# Patient Record
Sex: FEMALE | Race: WHITE | NOT HISPANIC OR LATINO | Employment: OTHER | URBAN - METROPOLITAN AREA
[De-identification: names, ages, dates, MRNs, and addresses within clinical notes are randomized per-mention and may not be internally consistent; named-entity substitution may affect disease eponyms.]

---

## 2017-05-02 ENCOUNTER — TRANSCRIBE ORDERS (OUTPATIENT)
Dept: ADMINISTRATIVE | Facility: HOSPITAL | Age: 47
End: 2017-05-02

## 2017-05-02 DIAGNOSIS — R74.8 ABNORMAL LIVER ENZYMES: Primary | ICD-10-CM

## 2017-05-12 ENCOUNTER — HOSPITAL ENCOUNTER (OUTPATIENT)
Dept: ULTRASOUND IMAGING | Facility: HOSPITAL | Age: 47
Discharge: HOME | End: 2017-05-12
Attending: INTERNAL MEDICINE | Admitting: INTERNAL MEDICINE

## 2017-05-12 DIAGNOSIS — R74.8 ABNORMAL LIVER ENZYMES: ICD-10-CM

## 2017-05-12 PROCEDURE — 76705 ECHO EXAM OF ABDOMEN: CPT

## 2017-10-25 ENCOUNTER — TRANSCRIBE ORDERS (OUTPATIENT)
Dept: ADMINISTRATIVE | Facility: HOSPITAL | Age: 47
End: 2017-10-25

## 2017-10-25 DIAGNOSIS — Z12.31 VISIT FOR SCREENING MAMMOGRAM: Primary | ICD-10-CM

## 2018-01-15 ENCOUNTER — APPOINTMENT (OUTPATIENT)
Dept: MAMMOGRAPHY | Facility: HOSPITAL | Age: 48
End: 2018-01-15
Attending: OBSTETRICS & GYNECOLOGY

## 2018-02-06 ENCOUNTER — HOSPITAL ENCOUNTER (OUTPATIENT)
Dept: MAMMOGRAPHY | Facility: HOSPITAL | Age: 48
Discharge: HOME | End: 2018-02-06
Attending: OBSTETRICS & GYNECOLOGY | Admitting: OBSTETRICS & GYNECOLOGY

## 2018-02-06 DIAGNOSIS — Z12.31 VISIT FOR SCREENING MAMMOGRAM: ICD-10-CM

## 2018-02-06 PROCEDURE — 77063 BREAST TOMOSYNTHESIS BI: CPT

## 2018-02-06 PROCEDURE — 77063 BREAST TOMOSYNTHESIS BI: CPT | Performed by: RADIOLOGY

## 2018-02-06 PROCEDURE — 77067 SCR MAMMO BI INCL CAD: CPT | Performed by: RADIOLOGY

## 2018-02-06 PROCEDURE — 77067 SCR MAMMO BI INCL CAD: CPT

## 2018-08-17 ENCOUNTER — LAB (OUTPATIENT)
Dept: LAB | Facility: HOSPITAL | Age: 48
End: 2018-08-17

## 2018-08-17 ENCOUNTER — TRANSCRIBE ORDERS (OUTPATIENT)
Dept: LAB | Facility: HOSPITAL | Age: 48
End: 2018-08-17

## 2018-08-17 DIAGNOSIS — N95.1 SYMPTOMATIC MENOPAUSAL OR FEMALE CLIMACTERIC STATES: Primary | ICD-10-CM

## 2018-08-17 DIAGNOSIS — N95.1 SYMPTOMATIC MENOPAUSAL OR FEMALE CLIMACTERIC STATES: ICD-10-CM

## 2018-08-17 LAB
ESTRADIOL SERPL HS-MCNC: 26 PG/ML
TESTOST SERPL-MCNC: 24.55 NG/DL (ref 0–780.1)

## 2018-08-17 PROCEDURE — 36415 COLL VENOUS BLD VENIPUNCTURE: CPT | Performed by: OBSTETRICS & GYNECOLOGY

## 2018-08-17 PROCEDURE — 84403 ASSAY OF TOTAL TESTOSTERONE: CPT

## 2018-08-17 PROCEDURE — 82670 ASSAY OF TOTAL ESTRADIOL: CPT | Performed by: OBSTETRICS & GYNECOLOGY

## 2018-10-15 ENCOUNTER — TRANSCRIBE ORDERS (OUTPATIENT)
Dept: ADMINISTRATIVE | Facility: HOSPITAL | Age: 48
End: 2018-10-15

## 2018-10-15 DIAGNOSIS — Z12.31 VISIT FOR SCREENING MAMMOGRAM: Primary | ICD-10-CM

## 2018-10-16 ENCOUNTER — TRANSCRIBE ORDERS (OUTPATIENT)
Dept: LAB | Facility: HOSPITAL | Age: 48
End: 2018-10-16

## 2018-10-16 ENCOUNTER — LAB (OUTPATIENT)
Dept: LAB | Facility: HOSPITAL | Age: 48
End: 2018-10-16

## 2018-10-16 DIAGNOSIS — N95.1 SYMPTOMATIC MENOPAUSAL OR FEMALE CLIMACTERIC STATES: ICD-10-CM

## 2018-10-16 DIAGNOSIS — N95.1 SYMPTOMATIC MENOPAUSAL OR FEMALE CLIMACTERIC STATES: Primary | ICD-10-CM

## 2018-10-16 LAB
ESTRADIOL SERPL HS-MCNC: 27 PG/ML
TESTOST SERPL-MCNC: 29.92 NG/DL (ref 0–780.1)

## 2018-10-16 PROCEDURE — 84403 ASSAY OF TOTAL TESTOSTERONE: CPT

## 2018-10-16 PROCEDURE — 82670 ASSAY OF TOTAL ESTRADIOL: CPT | Performed by: OBSTETRICS & GYNECOLOGY

## 2018-10-16 PROCEDURE — 36415 COLL VENOUS BLD VENIPUNCTURE: CPT | Performed by: OBSTETRICS & GYNECOLOGY

## 2018-11-27 ENCOUNTER — APPOINTMENT (OUTPATIENT)
Dept: LAB | Facility: HOSPITAL | Age: 48
End: 2018-11-27

## 2018-11-27 ENCOUNTER — TRANSCRIBE ORDERS (OUTPATIENT)
Dept: LAB | Facility: HOSPITAL | Age: 48
End: 2018-11-27

## 2018-11-27 DIAGNOSIS — N95.1 SYMPTOMATIC MENOPAUSAL OR FEMALE CLIMACTERIC STATES: Primary | ICD-10-CM

## 2018-11-27 LAB — ESTRADIOL SERPL HS-MCNC: 26 PG/ML

## 2018-11-27 PROCEDURE — 82670 ASSAY OF TOTAL ESTRADIOL: CPT | Performed by: OBSTETRICS & GYNECOLOGY

## 2018-11-27 PROCEDURE — 36415 COLL VENOUS BLD VENIPUNCTURE: CPT | Performed by: OBSTETRICS & GYNECOLOGY

## 2019-02-07 ENCOUNTER — HOSPITAL ENCOUNTER (OUTPATIENT)
Dept: MAMMOGRAPHY | Facility: HOSPITAL | Age: 49
Discharge: HOME | End: 2019-02-07
Attending: OBSTETRICS & GYNECOLOGY | Admitting: OBSTETRICS & GYNECOLOGY

## 2019-02-07 DIAGNOSIS — Z12.31 VISIT FOR SCREENING MAMMOGRAM: ICD-10-CM

## 2019-02-07 PROCEDURE — 77067 SCR MAMMO BI INCL CAD: CPT

## 2019-02-07 PROCEDURE — 77063 BREAST TOMOSYNTHESIS BI: CPT

## 2019-02-07 PROCEDURE — 77063 BREAST TOMOSYNTHESIS BI: CPT | Performed by: RADIOLOGY

## 2019-02-07 PROCEDURE — 77067 SCR MAMMO BI INCL CAD: CPT | Performed by: RADIOLOGY

## 2019-06-27 ENCOUNTER — APPOINTMENT (OUTPATIENT)
Dept: GENERAL RADIOLOGY | Facility: HOSPITAL | Age: 49
End: 2019-06-27

## 2019-06-27 ENCOUNTER — HOSPITAL ENCOUNTER (EMERGENCY)
Facility: HOSPITAL | Age: 49
Discharge: HOME | End: 2019-06-27
Attending: EMERGENCY MEDICINE | Admitting: EMERGENCY MEDICINE

## 2019-06-27 VITALS
DIASTOLIC BLOOD PRESSURE: 77 MMHG | SYSTOLIC BLOOD PRESSURE: 165 MMHG | HEIGHT: 66 IN | BODY MASS INDEX: 28.93 KG/M2 | HEART RATE: 68 BPM | OXYGEN SATURATION: 100 % | TEMPERATURE: 98.5 F | RESPIRATION RATE: 16 BRPM | WEIGHT: 180 LBS

## 2019-06-27 DIAGNOSIS — S42.291A CLOSED FRACTURE OF HEAD OF RIGHT HUMERUS, INITIAL ENCOUNTER: Primary | ICD-10-CM
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
## 2019-06-27 PROCEDURE — 73030 X-RAY EXAM OF SHOULDER: CPT

## 2019-06-27 PROCEDURE — 99283 EMERGENCY DEPT VISIT LOW MDM: CPT

## 2019-06-27 RX ORDER — OXYCODONE HYDROCHLORIDE AND ACETAMINOPHEN 5; 325 MG/1; MG/1
1 TABLET ORAL EVERY 4 HOURS PRN
Qty: 18 TABLET | Refills: 0 | Status: ON HOLD | OUTPATIENT
Start: 2019-06-27 | End: 2019-07-31

## 2019-06-27 RX ORDER — OXYCODONE AND ACETAMINOPHEN 7.5; 325 MG/1; MG/1
1 TABLET ORAL ONCE
Status: COMPLETED | OUTPATIENT
Start: 2019-06-27 | End: 2019-06-27

## 2019-06-27 RX ORDER — ONDANSETRON 4 MG/1
4 TABLET, ORALLY DISINTEGRATING ORAL ONCE
Status: COMPLETED | OUTPATIENT
Start: 2019-06-27 | End: 2019-06-27

## 2019-06-27 RX ADMIN — ONDANSETRON 4 MG: 4 TABLET, ORALLY DISINTEGRATING ORAL at 22:20

## 2019-06-27 RX ADMIN — OXYCODONE HYDROCHLORIDE AND ACETAMINOPHEN 1 TABLET: 7.5; 325 TABLET ORAL at 22:13

## 2019-07-01 ENCOUNTER — OFFICE VISIT (OUTPATIENT)
Dept: ORTHOPEDIC SURGERY | Facility: CLINIC | Age: 49
End: 2019-07-01

## 2019-07-01 VITALS — OXYGEN SATURATION: 98 % | HEIGHT: 66 IN | HEART RATE: 103 BPM | BODY MASS INDEX: 28.88 KG/M2 | WEIGHT: 179.68 LBS

## 2019-07-01 DIAGNOSIS — S42.291A CLOSED 3-PART FRACTURE OF PROXIMAL END OF RIGHT HUMERUS, INITIAL ENCOUNTER: Primary | ICD-10-CM

## 2019-07-01 PROCEDURE — 99204 OFFICE O/P NEW MOD 45 MIN: CPT | Performed by: ORTHOPAEDIC SURGERY

## 2019-07-01 RX ORDER — ESTRADIOL 0.75 MG/1.25G
GEL, METERED TOPICAL
Refills: 2 | COMMUNITY
Start: 2019-04-29

## 2019-07-01 RX ORDER — BUPROPION HYDROCHLORIDE 150 MG/1
150 TABLET ORAL EVERY MORNING
COMMUNITY
Start: 2019-06-28

## 2019-07-01 RX ORDER — SODIUM, POTASSIUM,MAG SULFATES 17.5-3.13G
SOLUTION, RECONSTITUTED, ORAL ORAL
Refills: 0 | COMMUNITY
Start: 2019-04-14 | End: 2019-07-31 | Stop reason: HOSPADM

## 2019-07-01 NOTE — PROGRESS NOTES
[facility] [place]    Subjective     Chief Complaint   Patient presents with   • Right Shoulder - Pain     Closed Fracture of Head of Right Humerus  ER f/u        HPI  Ysabel Rasheed is a 49 y.o. female.  She fell on last Thursday.  Seen in the ER.  Pain is 8 out of 10.  Is on Percocet.  She is in a sling.  She is right-hand dominant.    History reviewed. No pertinent past medical history.   Past Surgical History:   Procedure Laterality Date   • HYSTERECTOMY      44      Family History   Problem Relation Age of Onset   • Breast cancer Neg Hx    • Ovarian cancer Neg Hx      Social History     Socioeconomic History   • Marital status:      Spouse name: Not on file   • Number of children: Not on file   • Years of education: Not on file   • Highest education level: Not on file   Tobacco Use   • Smoking status: Never Smoker   • Smokeless tobacco: Never Used   Substance and Sexual Activity   • Alcohol use: Yes     Comment: OCC   • Drug use: No   • Sexual activity: Defer      Current Outpatient Medications on File Prior to Visit   Medication Sig Dispense Refill   • buPROPion XL (WELLBUTRIN XL) 150 MG 24 hr tablet      • ESTROGEL 0.75 MG/1.25 GM (0.06%) topical gel   2   • oxyCODONE-acetaminophen (PERCOCET) 5-325 MG per tablet Take 1 tablet by mouth Every 4 (Four) Hours As Needed (pain). 18 tablet 0   • SUPREP BOWEL PREP KIT 17.5-3.13-1.6 GM/177ML solution oral solution   0     No current facility-administered medications on file prior to visit.       No Known Allergies     The following portions of the patient's history were reviewed and updated as appropriate: allergies, current medications, past family history, past medical history, past social history, past surgical history and problem list.    Review of Systems   Constitutional: Negative.    HENT: Negative.    Eyes: Negative.    Respiratory: Negative.    Cardiovascular: Negative.    Gastrointestinal: Negative.    Endocrine: Negative.   "  Genitourinary: Negative.    Musculoskeletal: Positive for arthralgias and joint swelling.   Skin: Negative.    Allergic/Immunologic: Negative.    Neurological: Negative.    Hematological: Negative.    Psychiatric/Behavioral: Negative.         Objective      Physical Exam  Pulse 103   Ht 167.6 cm (65.98\")   Wt 81.5 kg (179 lb 10.8 oz)   SpO2 98%   Breastfeeding? No   BMI 29.01 kg/m²     Body mass index is 29.01 kg/m².    GENERAL APPEARANCE: awake, alert & oriented x 3, in no acute distress and well developed, well nourished  PSYCH: normal mood and affect  LUNGS:  breathing nonlabored, no wheezing  EYES: sclera anicteric, pupils equal  CARDIOVASCULAR: palpable pulses dorsalis pedis, palpable posterior tibial bilaterally. Capillary refill less than 2 seconds  INTEGUMENTARY: skin intact, no clubbing, cyanosis  NEUROLOGIC:  Normal Sensation and reflexes       Ortho Exam  Right shoulder is tender.  Distally neurovascular intact.  Cannot assess motor function secondary to fracture.  The skin is intact.  No lymphadenopathy.  No erythema.  Imaging/Studies  Imaging Results (last 7 days)     ** No results found for the last 168 hours. **        I viewed the x-rays from June 27 which show a displaced three-part proximal humerus fracture  Assessment/Plan        ICD-10-CM ICD-9-CM   1. Closed 3-part fracture of proximal end of right humerus, initial encounter S42.291A 812.00       Orders Placed This Encounter   Procedures   • CT Upper Extremity Right Without Contrast      I have ordered a CT scan.  She may need surgery.  She is placed in a fracture brace.  I will see her back after the CT scan.  She will continue Percocet as needed for pain.    Medical Decision Making  Management Options : prescription/IM medicine and close treatment of fracture or dislocation  Data/Risk: radiology tests and independent visualization of imaging, lab tests, or EMG/NCV    Angel Albert MD  07/01/19  11:41 AM         EMR " Dragon/Transcription disclaimer:  Much of this encounter note is an electronic transcription of spoken language to printed text. Electronic transcription of spoken language may permit erroneous, or at times, nonsensical words or phrases to be inadvertently transcribed. Although I have reviewed the note for such errors, some may still exist.

## 2019-07-02 ENCOUNTER — HOSPITAL ENCOUNTER (OUTPATIENT)
Dept: CT IMAGING | Facility: HOSPITAL | Age: 49
Discharge: HOME | End: 2019-07-02
Admitting: ORTHOPAEDIC SURGERY

## 2019-07-02 DIAGNOSIS — S42.291A CLOSED 3-PART FRACTURE OF PROXIMAL END OF RIGHT HUMERUS, INITIAL ENCOUNTER: ICD-10-CM

## 2019-07-02 PROCEDURE — 73200 CT UPPER EXTREMITY W/O DYE: CPT

## 2019-07-10 ENCOUNTER — APPOINTMENT (OUTPATIENT)
Dept: PREADMISSION TESTING | Facility: HOSPITAL | Age: 49
End: 2019-07-10

## 2019-07-10 ENCOUNTER — HOSPITAL ENCOUNTER (OUTPATIENT)
Dept: GENERAL RADIOLOGY | Facility: HOSPITAL | Age: 49
Discharge: HOME | End: 2019-07-10
Admitting: ORTHOPAEDIC SURGERY

## 2019-07-10 LAB
ANION GAP SERPL CALCULATED.3IONS-SCNC: 13 MMOL/L (ref 5–15)
BUN BLD-MCNC: 11 MG/DL (ref 6–20)
BUN/CREAT SERPL: 18.6 (ref 7–25)
CALCIUM SPEC-SCNC: 9.7 MG/DL (ref 8.6–10.5)
CHLORIDE SERPL-SCNC: 101 MMOL/L (ref 98–107)
CO2 SERPL-SCNC: 26 MMOL/L (ref 22–29)
CREAT BLD-MCNC: 0.59 MG/DL (ref 0.57–1)
DEPRECATED RDW RBC AUTO: 42.1 FL (ref 37–54)
ERYTHROCYTE [DISTWIDTH] IN BLOOD BY AUTOMATED COUNT: 12.1 % (ref 12.3–15.4)
GFR SERPL CREATININE-BSD FRML MDRD: 107 ML/MIN/1.73
GLUCOSE BLD-MCNC: 103 MG/DL (ref 65–99)
HBA1C MFR BLD: 5.4 % (ref 4.8–5.6)
HCT VFR BLD AUTO: 43.4 % (ref 34–46.6)
HGB BLD-MCNC: 14.1 G/DL (ref 12–15.9)
MCH RBC QN AUTO: 30.3 PG (ref 26.6–33)
MCHC RBC AUTO-ENTMCNC: 32.5 G/DL (ref 31.5–35.7)
MCV RBC AUTO: 93.1 FL (ref 79–97)
PLATELET # BLD AUTO: 349 10*3/MM3 (ref 140–450)
PMV BLD AUTO: 10.3 FL (ref 6–12)
POTASSIUM BLD-SCNC: 4.7 MMOL/L (ref 3.5–5.2)
RBC # BLD AUTO: 4.66 10*6/MM3 (ref 3.77–5.28)
SODIUM BLD-SCNC: 140 MMOL/L (ref 136–145)
WBC NRBC COR # BLD: 7.5 10*3/MM3 (ref 3.4–10.8)

## 2019-07-10 PROCEDURE — 93010 ELECTROCARDIOGRAM REPORT: CPT | Performed by: INTERNAL MEDICINE

## 2019-07-10 PROCEDURE — 36415 COLL VENOUS BLD VENIPUNCTURE: CPT

## 2019-07-10 PROCEDURE — 71046 X-RAY EXAM CHEST 2 VIEWS: CPT

## 2019-07-10 PROCEDURE — 80048 BASIC METABOLIC PNL TOTAL CA: CPT | Performed by: ORTHOPAEDIC SURGERY

## 2019-07-10 PROCEDURE — 93005 ELECTROCARDIOGRAM TRACING: CPT

## 2019-07-10 PROCEDURE — 85027 COMPLETE CBC AUTOMATED: CPT | Performed by: ORTHOPAEDIC SURGERY

## 2019-07-10 PROCEDURE — 83036 HEMOGLOBIN GLYCOSYLATED A1C: CPT | Performed by: ORTHOPAEDIC SURGERY

## 2019-07-10 NOTE — DISCHARGE INSTRUCTIONS
The following information and instructions were given:    Nothing to eat or drink after midnight except sips of water with routine prescribed medication (except blood thinner, certain blood pressure medications, diabetes, or weight reducing medication) unless otherwise instructed by your physician.  Do not eat, drink, smoke or chew gum after midnight the night before surgery. This also includes no mints.    EXCEPTION: ERAS patients Patient instructed to drink 20 ounces (or until full) of Gatorade or 20 ounces of G2 (if diabetic) and complete 1 hour before arrival time on the day of surgery. (NO RED Gatorade or G2)    Patient verbalized understanding.      DO NOT shave for two days before your procedure.  Do not wear makeup.      DO NOT wear fingernail polish (gel/regular) and/or acrylic/artificial nails on the day of surgery.   If a patient had recent manicure and would rather not remove polish or artificial nails, then the minimum requirement is that the polish/artificial nails must be removed from the middle finger on each hand.      If patient was having surgery on an upper extremity, then the patient was instructed that fingernail polish/artificial fingernails must be removed for surgery.  NO EXCEPTIONS.      If patient was having surgery on a lower extremity, then the patient was instructed that toenail polish on both extremities must be removed for surgery.  NO EXCEPTIONS.    Remove all jewelry (advised to go to jeweler if unable to remove).  Jewelry especially rings can no longer be taped for surgery.    Leave anything you consider valuable at home.      Bring the following with you (if applicable)   -picture ID and insurance cards   -Co-pay/deductible required by insurance   -Medications in the original bottles (not a list) including all over-the-counter meds     Education booklet, brochure, and/or given to patient.    Patient must have a  for transportation home after procedure.  It must be an   adult  that will take responsibility for care for 24 hours after surgery.      Patient instructed to drink 20 ounces (or until full) of Gatorade or 20 ounces of G2 (if diabetic) and complete 1 hour before arrival time for procedure (NO RED Gatorade or G2)    Patient verbalized understanding.

## 2019-07-29 ENCOUNTER — ANESTHESIA EVENT (OUTPATIENT)
Dept: PERIOP | Facility: HOSPITAL | Age: 49
End: 2019-07-29

## 2019-07-29 ENCOUNTER — ANESTHESIA (OUTPATIENT)
Dept: PERIOP | Facility: HOSPITAL | Age: 49
End: 2019-07-29

## 2019-07-29 ENCOUNTER — HOSPITAL ENCOUNTER (INPATIENT)
Facility: HOSPITAL | Age: 49
LOS: 2 days | Discharge: HOME | End: 2019-07-31
Attending: ORTHOPAEDIC SURGERY | Admitting: ORTHOPAEDIC SURGERY

## 2019-07-29 DIAGNOSIS — Z74.09 IMPAIRED FUNCTIONAL MOBILITY, BALANCE, GAIT, AND ENDURANCE: ICD-10-CM

## 2019-07-29 DIAGNOSIS — T81.40XA POST-OPERATIVE INFECTION: ICD-10-CM

## 2019-07-29 DIAGNOSIS — Z74.09 IMPAIRED MOBILITY AND ADLS: Primary | ICD-10-CM

## 2019-07-29 DIAGNOSIS — Z78.9 IMPAIRED MOBILITY AND ADLS: Primary | ICD-10-CM

## 2019-07-29 LAB
B-HCG UR QL: NEGATIVE
INTERNAL NEGATIVE CONTROL: NEGATIVE
INTERNAL POSITIVE CONTROL: POSITIVE
Lab: NORMAL

## 2019-07-29 PROCEDURE — 87205 SMEAR GRAM STAIN: CPT | Performed by: ORTHOPAEDIC SURGERY

## 2019-07-29 PROCEDURE — 25010000003 LIDOCAINE 1 % SOLUTION: Performed by: ANESTHESIOLOGY

## 2019-07-29 PROCEDURE — 25010000002 ONDANSETRON PER 1 MG: Performed by: ANESTHESIOLOGY

## 2019-07-29 PROCEDURE — 87077 CULTURE AEROBIC IDENTIFY: CPT | Performed by: ORTHOPAEDIC SURGERY

## 2019-07-29 PROCEDURE — 87206 SMEAR FLUORESCENT/ACID STAI: CPT | Performed by: ORTHOPAEDIC SURGERY

## 2019-07-29 PROCEDURE — C1713 ANCHOR/SCREW BN/BN,TIS/BN: HCPCS | Performed by: ORTHOPAEDIC SURGERY

## 2019-07-29 PROCEDURE — 25010000002 PROPOFOL 10 MG/ML EMULSION: Performed by: ANESTHESIOLOGY

## 2019-07-29 PROCEDURE — 87070 CULTURE OTHR SPECIMN AEROBIC: CPT | Performed by: ORTHOPAEDIC SURGERY

## 2019-07-29 PROCEDURE — 25010000002 FENTANYL CITRATE (PF) 100 MCG/2ML SOLUTION: Performed by: ANESTHESIOLOGY

## 2019-07-29 PROCEDURE — 25010000002 ONDANSETRON PER 1 MG: Performed by: ORTHOPAEDIC SURGERY

## 2019-07-29 PROCEDURE — 81025 URINE PREGNANCY TEST: CPT | Performed by: ANESTHESIOLOGY

## 2019-07-29 PROCEDURE — 25010000002 MIDAZOLAM PER 1 MG: Performed by: ANESTHESIOLOGY

## 2019-07-29 PROCEDURE — 87186 SC STD MICRODIL/AGAR DIL: CPT | Performed by: ORTHOPAEDIC SURGERY

## 2019-07-29 PROCEDURE — 25010000002 KETOROLAC TROMETHAMINE PER 15 MG: Performed by: ORTHOPAEDIC SURGERY

## 2019-07-29 PROCEDURE — 87116 MYCOBACTERIA CULTURE: CPT | Performed by: ORTHOPAEDIC SURGERY

## 2019-07-29 PROCEDURE — 25010000002 PIPERACILLIN SOD-TAZOBACTAM PER 1 G: Performed by: ORTHOPAEDIC SURGERY

## 2019-07-29 PROCEDURE — 87075 CULTR BACTERIA EXCEPT BLOOD: CPT | Performed by: ORTHOPAEDIC SURGERY

## 2019-07-29 PROCEDURE — 0PBC0ZZ EXCISION OF RIGHT HUMERAL HEAD, OPEN APPROACH: ICD-10-PCS | Performed by: ORTHOPAEDIC SURGERY

## 2019-07-29 PROCEDURE — 25010000002 HYDROMORPHONE PER 4 MG: Performed by: ANESTHESIOLOGY

## 2019-07-29 PROCEDURE — 25010000002 VANCOMYCIN 1 G RECONSTITUTED SOLUTION: Performed by: ORTHOPAEDIC SURGERY

## 2019-07-29 PROCEDURE — 87102 FUNGUS ISOLATION CULTURE: CPT | Performed by: ORTHOPAEDIC SURGERY

## 2019-07-29 PROCEDURE — 25010000002 DEXAMETHASONE PER 1 MG: Performed by: ANESTHESIOLOGY

## 2019-07-29 PROCEDURE — 25010000002 VANCOMYCIN: Performed by: ORTHOPAEDIC SURGERY

## 2019-07-29 PROCEDURE — 25010000002 CEFTRIAXONE PER 250 MG: Performed by: ORTHOPAEDIC SURGERY

## 2019-07-29 DEVICE — STIMULAN® RAPID CURE PROVIDED STERILE FOR SINGLE PATIENT USE. STIMULAN® RAPID CURE CONTAINS CALCIUM SULFATE POWDER AND MIXING SOLUTION IN PRE-MEASURED QUANTITIES SO THAT WHEN MIXED TOGETHER IN A STERILE MIXING BOWL, THE RESULTANT PASTE IS TO BE DIGITALLY PACKED INTO OPEN BONE VOID/GAP TO SET INSITU OR PLACED INTO THE MOULD PROVIDED, THE MIXTURE SETS TO FORM BEADS. THE BIODEGRADABLE, RADIOPAQUE BEADS ARE RESORBED IN APPROXIMATELY 30 – 60 DAYS WHEN USED IN ACCORDANCE WITH THE DEVICE LABELLING. STIMULAN® RAPID CURE IS MANUFACTURED FROM SYNTHETIC IMPLANT GRADE CALCIUM SULFATE DIHYDRATE(CASO4.2H2O) THAT RESORBS AND IS REPLACED WITH BONE DURING THE HEALING PROCESS. ALSO, AS THE BONE VOID FILLER BEADS ARE BIODEGRADABLE AND BIOCOMPATIBLE, THEY MAY BE USED AT AN INFECTED SITE.
Type: IMPLANTABLE DEVICE | Site: SHOULDER | Status: FUNCTIONAL
Brand: STIMULAN® RAPID CURE

## 2019-07-29 RX ORDER — NALOXONE HCL 0.4 MG/ML
0.1 VIAL (ML) INJECTION
Status: DISCONTINUED | OUTPATIENT
Start: 2019-07-29 | End: 2019-07-31 | Stop reason: HOSPADM

## 2019-07-29 RX ORDER — HYDROCODONE BITARTRATE AND ACETAMINOPHEN 10; 325 MG/1; MG/1
1 TABLET ORAL EVERY 4 HOURS PRN
Status: DISCONTINUED | OUTPATIENT
Start: 2019-07-29 | End: 2019-07-31 | Stop reason: HOSPADM

## 2019-07-29 RX ORDER — ONDANSETRON 2 MG/ML
4 INJECTION INTRAMUSCULAR; INTRAVENOUS ONCE AS NEEDED
Status: COMPLETED | OUTPATIENT
Start: 2019-07-29 | End: 2019-07-29

## 2019-07-29 RX ORDER — ATRACURIUM BESYLATE 10 MG/ML
INJECTION, SOLUTION INTRAVENOUS AS NEEDED
Status: DISCONTINUED | OUTPATIENT
Start: 2019-07-29 | End: 2019-07-29 | Stop reason: SURG

## 2019-07-29 RX ORDER — PROMETHAZINE HYDROCHLORIDE 25 MG/1
25 TABLET ORAL ONCE AS NEEDED
Status: DISCONTINUED | OUTPATIENT
Start: 2019-07-29 | End: 2019-07-29

## 2019-07-29 RX ORDER — KETOROLAC TROMETHAMINE 30 MG/ML
30 INJECTION, SOLUTION INTRAMUSCULAR; INTRAVENOUS ONCE AS NEEDED
Status: COMPLETED | OUTPATIENT
Start: 2019-07-29 | End: 2019-07-29

## 2019-07-29 RX ORDER — ONDANSETRON 2 MG/ML
INJECTION INTRAMUSCULAR; INTRAVENOUS AS NEEDED
Status: DISCONTINUED | OUTPATIENT
Start: 2019-07-29 | End: 2019-07-29 | Stop reason: SURG

## 2019-07-29 RX ORDER — MIDAZOLAM HYDROCHLORIDE 1 MG/ML
2 INJECTION INTRAMUSCULAR; INTRAVENOUS ONCE
Status: COMPLETED | OUTPATIENT
Start: 2019-07-29 | End: 2019-07-29

## 2019-07-29 RX ORDER — OXYCODONE HYDROCHLORIDE AND ACETAMINOPHEN 5; 325 MG/1; MG/1
1 TABLET ORAL EVERY 4 HOURS PRN
Status: DISCONTINUED | OUTPATIENT
Start: 2019-07-29 | End: 2019-07-31 | Stop reason: HOSPADM

## 2019-07-29 RX ORDER — FAMOTIDINE 20 MG/1
20 TABLET, FILM COATED ORAL ONCE
Status: COMPLETED | OUTPATIENT
Start: 2019-07-29 | End: 2019-07-29

## 2019-07-29 RX ORDER — DEXAMETHASONE SODIUM PHOSPHATE 4 MG/ML
INJECTION, SOLUTION INTRA-ARTICULAR; INTRALESIONAL; INTRAMUSCULAR; INTRAVENOUS; SOFT TISSUE AS NEEDED
Status: DISCONTINUED | OUTPATIENT
Start: 2019-07-29 | End: 2019-07-29 | Stop reason: SURG

## 2019-07-29 RX ORDER — SACCHAROMYCES BOULARDII 250 MG
250 CAPSULE ORAL 2 TIMES DAILY
COMMUNITY

## 2019-07-29 RX ORDER — CEPHALEXIN 500 MG/1
500 CAPSULE ORAL 4 TIMES DAILY
COMMUNITY
End: 2019-07-31 | Stop reason: HOSPADM

## 2019-07-29 RX ORDER — SODIUM CHLORIDE, SODIUM LACTATE, POTASSIUM CHLORIDE, CALCIUM CHLORIDE 600; 310; 30; 20 MG/100ML; MG/100ML; MG/100ML; MG/100ML
INJECTION, SOLUTION INTRAVENOUS CONTINUOUS PRN
Status: DISCONTINUED | OUTPATIENT
Start: 2019-07-29 | End: 2019-07-29 | Stop reason: SURG

## 2019-07-29 RX ORDER — BUPROPION HYDROCHLORIDE 150 MG/1
150 TABLET ORAL EVERY MORNING
Status: DISCONTINUED | OUTPATIENT
Start: 2019-07-30 | End: 2019-07-31 | Stop reason: HOSPADM

## 2019-07-29 RX ORDER — PROMETHAZINE HYDROCHLORIDE 25 MG/1
25 SUPPOSITORY RECTAL ONCE AS NEEDED
Status: DISCONTINUED | OUTPATIENT
Start: 2019-07-29 | End: 2019-07-29

## 2019-07-29 RX ORDER — FENTANYL CITRATE 50 UG/ML
INJECTION, SOLUTION INTRAMUSCULAR; INTRAVENOUS AS NEEDED
Status: DISCONTINUED | OUTPATIENT
Start: 2019-07-29 | End: 2019-07-29 | Stop reason: SURG

## 2019-07-29 RX ORDER — LIDOCAINE HYDROCHLORIDE 10 MG/ML
2 INJECTION, SOLUTION INFILTRATION; PERINEURAL ONCE
Status: COMPLETED | OUTPATIENT
Start: 2019-07-29 | End: 2019-07-29

## 2019-07-29 RX ORDER — GLYCOPYRROLATE 0.2 MG/ML
INJECTION INTRAMUSCULAR; INTRAVENOUS AS NEEDED
Status: DISCONTINUED | OUTPATIENT
Start: 2019-07-29 | End: 2019-07-29 | Stop reason: SURG

## 2019-07-29 RX ORDER — NEOSTIGMINE METHYLSULFATE 5 MG/5 ML
SYRINGE (ML) INTRAVENOUS AS NEEDED
Status: DISCONTINUED | OUTPATIENT
Start: 2019-07-29 | End: 2019-07-29 | Stop reason: SURG

## 2019-07-29 RX ORDER — ONDANSETRON 4 MG/1
4 TABLET, FILM COATED ORAL EVERY 6 HOURS PRN
Status: DISCONTINUED | OUTPATIENT
Start: 2019-07-29 | End: 2019-07-31 | Stop reason: HOSPADM

## 2019-07-29 RX ORDER — PROPOFOL 10 MG/ML
VIAL (ML) INTRAVENOUS AS NEEDED
Status: DISCONTINUED | OUTPATIENT
Start: 2019-07-29 | End: 2019-07-29 | Stop reason: SURG

## 2019-07-29 RX ORDER — HYDROMORPHONE HYDROCHLORIDE 1 MG/ML
0.5 INJECTION, SOLUTION INTRAMUSCULAR; INTRAVENOUS; SUBCUTANEOUS
Status: DISCONTINUED | OUTPATIENT
Start: 2019-07-29 | End: 2019-07-29

## 2019-07-29 RX ORDER — PROMETHAZINE HYDROCHLORIDE 25 MG/ML
6.25 INJECTION, SOLUTION INTRAMUSCULAR; INTRAVENOUS ONCE AS NEEDED
Status: DISCONTINUED | OUTPATIENT
Start: 2019-07-29 | End: 2019-07-29

## 2019-07-29 RX ORDER — VANCOMYCIN HYDROCHLORIDE 1 G/20ML
INJECTION, POWDER, LYOPHILIZED, FOR SOLUTION INTRAVENOUS AS NEEDED
Status: DISCONTINUED | OUTPATIENT
Start: 2019-07-29 | End: 2019-07-29 | Stop reason: HOSPADM

## 2019-07-29 RX ORDER — SODIUM CHLORIDE, SODIUM LACTATE, POTASSIUM CHLORIDE, AND CALCIUM CHLORIDE .6; .31; .03; .02 G/100ML; G/100ML; G/100ML; G/100ML
9 INJECTION, SOLUTION INTRAVENOUS CONTINUOUS
Status: DISCONTINUED | OUTPATIENT
Start: 2019-07-29 | End: 2019-07-29

## 2019-07-29 RX ORDER — LIDOCAINE HYDROCHLORIDE 10 MG/ML
INJECTION, SOLUTION INFILTRATION; PERINEURAL AS NEEDED
Status: DISCONTINUED | OUTPATIENT
Start: 2019-07-29 | End: 2019-07-29 | Stop reason: SURG

## 2019-07-29 RX ORDER — SACCHAROMYCES BOULARDII 250 MG
250 CAPSULE ORAL 2 TIMES DAILY
Status: DISCONTINUED | OUTPATIENT
Start: 2019-07-29 | End: 2019-07-31 | Stop reason: HOSPADM

## 2019-07-29 RX ORDER — DOCUSATE SODIUM 100 MG/1
100 CAPSULE, LIQUID FILLED ORAL 2 TIMES DAILY PRN
Status: DISCONTINUED | OUTPATIENT
Start: 2019-07-29 | End: 2019-07-31 | Stop reason: HOSPADM

## 2019-07-29 RX ORDER — ONDANSETRON 2 MG/ML
4 INJECTION INTRAMUSCULAR; INTRAVENOUS EVERY 6 HOURS PRN
Status: DISCONTINUED | OUTPATIENT
Start: 2019-07-29 | End: 2019-07-31 | Stop reason: HOSPADM

## 2019-07-29 RX ORDER — FENTANYL CITRATE 50 UG/ML
50 INJECTION, SOLUTION INTRAMUSCULAR; INTRAVENOUS
Status: DISCONTINUED | OUTPATIENT
Start: 2019-07-29 | End: 2019-07-29

## 2019-07-29 RX ADMIN — ONDANSETRON 4 MG: 2 INJECTION INTRAMUSCULAR; INTRAVENOUS at 22:31

## 2019-07-29 RX ADMIN — FAMOTIDINE 20 MG: 20 TABLET ORAL at 16:35

## 2019-07-29 RX ADMIN — DEXAMETHASONE SODIUM PHOSPHATE 4 MG: 4 INJECTION, SOLUTION INTRAMUSCULAR; INTRAVENOUS at 19:35

## 2019-07-29 RX ADMIN — ONDANSETRON 4 MG: 2 INJECTION INTRAMUSCULAR; INTRAVENOUS at 20:12

## 2019-07-29 RX ADMIN — SODIUM CHLORIDE, POTASSIUM CHLORIDE, SODIUM LACTATE AND CALCIUM CHLORIDE: 600; 310; 30; 20 INJECTION, SOLUTION INTRAVENOUS at 19:25

## 2019-07-29 RX ADMIN — FENTANYL CITRATE 100 MCG: 50 INJECTION, SOLUTION INTRAMUSCULAR; INTRAVENOUS at 20:10

## 2019-07-29 RX ADMIN — LIDOCAINE HYDROCHLORIDE 0.2 ML: 10 INJECTION, SOLUTION EPIDURAL; INFILTRATION; INTRACAUDAL; PERINEURAL at 16:35

## 2019-07-29 RX ADMIN — PROPOFOL 200 MG: 10 INJECTION, EMULSION INTRAVENOUS at 19:35

## 2019-07-29 RX ADMIN — FENTANYL CITRATE 100 MCG: 50 INJECTION, SOLUTION INTRAMUSCULAR; INTRAVENOUS at 19:35

## 2019-07-29 RX ADMIN — HYDROMORPHONE HYDROCHLORIDE 0.5 MG: 1 INJECTION, SOLUTION INTRAMUSCULAR; INTRAVENOUS; SUBCUTANEOUS at 20:50

## 2019-07-29 RX ADMIN — TAZOBACTAM SODIUM AND PIPERACILLIN SODIUM 3.38 G: 375; 3 INJECTION, SOLUTION INTRAVENOUS at 23:37

## 2019-07-29 RX ADMIN — KETOROLAC TROMETHAMINE 30 MG: 30 INJECTION, SOLUTION INTRAMUSCULAR; INTRAVENOUS at 21:17

## 2019-07-29 RX ADMIN — VANCOMYCIN HYDROCHLORIDE 1.25 G: 10 INJECTION, POWDER, LYOPHILIZED, FOR SOLUTION INTRAVENOUS at 19:40

## 2019-07-29 RX ADMIN — HYDROMORPHONE HYDROCHLORIDE 0.5 MG: 1 INJECTION, SOLUTION INTRAMUSCULAR; INTRAVENOUS; SUBCUTANEOUS at 21:10

## 2019-07-29 RX ADMIN — Medication 3 MG: at 20:12

## 2019-07-29 RX ADMIN — FENTANYL CITRATE 50 MCG: 0.05 INJECTION, SOLUTION INTRAMUSCULAR; INTRAVENOUS at 21:05

## 2019-07-29 RX ADMIN — LIDOCAINE HYDROCHLORIDE 50 MG: 10 INJECTION, SOLUTION INFILTRATION; PERINEURAL at 19:35

## 2019-07-29 RX ADMIN — MIDAZOLAM 2 MG: 1 INJECTION INTRAMUSCULAR; INTRAVENOUS at 16:40

## 2019-07-29 RX ADMIN — HYDROMORPHONE HYDROCHLORIDE 0.5 MG: 1 INJECTION, SOLUTION INTRAMUSCULAR; INTRAVENOUS; SUBCUTANEOUS at 20:55

## 2019-07-29 RX ADMIN — SODIUM CHLORIDE, POTASSIUM CHLORIDE, SODIUM LACTATE AND CALCIUM CHLORIDE 9 ML/HR: 600; 310; 30; 20 INJECTION, SOLUTION INTRAVENOUS at 16:35

## 2019-07-29 RX ADMIN — FENTANYL CITRATE 50 MCG: 0.05 INJECTION, SOLUTION INTRAMUSCULAR; INTRAVENOUS at 21:00

## 2019-07-29 RX ADMIN — GLYCOPYRROLATE 0.4 MG: 0.2 INJECTION, SOLUTION INTRAMUSCULAR; INTRAVENOUS at 20:12

## 2019-07-29 RX ADMIN — ONDANSETRON 4 MG: 2 INJECTION INTRAMUSCULAR; INTRAVENOUS at 21:22

## 2019-07-29 RX ADMIN — ATRACURIUM BESYLATE 25 MG: 10 INJECTION, SOLUTION INTRAVENOUS at 19:35

## 2019-07-29 NOTE — ANESTHESIA PROCEDURE NOTES
Airway  Urgency: elective    Airway not difficult    General Information and Staff    Patient location during procedure: OR  Anesthesiologist: Demarcus Durham MD    Indications and Patient Condition  Indications for airway management: airway protection    Preoxygenated: yes  MILS not maintained throughout  Mask difficulty assessment: 1 - vent by mask    Final Airway Details  Final airway type: endotracheal airway      Successful airway: ETT  Cuffed: yes   Successful intubation technique: direct laryngoscopy  Endotracheal tube insertion site: oral  Blade: Mahnaz  Blade size: 3  ETT size (mm): 7.0  Cormack-Lehane Classification: grade IIa - partial view of glottis  Placement verified by: chest auscultation and capnometry   Measured from: lips  ETT to lips (cm): 20  Number of attempts at approach: 1    Additional Comments  Negative epigastric sounds, Breath sound equal bilaterally with symmetric chest rise and fall

## 2019-07-29 NOTE — ANESTHESIA PREPROCEDURE EVALUATION
Anesthesia Evaluation     Patient summary reviewed and Nursing notes reviewed                Airway   Mallampati: II  TM distance: >3 FB  Neck ROM: full  No difficulty expected  Dental - normal exam     Pulmonary - negative pulmonary ROS and normal exam   Cardiovascular - negative cardio ROS and normal exam        Neuro/Psych- negative ROS  GI/Hepatic/Renal/Endo - negative ROS     Musculoskeletal (-) negative ROS        ROS comment: S/p closed right humerus fracture one month ago s/p repair 3 weeks ago  Abdominal  - normal exam    Bowel sounds: normal.   Substance History - negative use     OB/GYN negative ob/gyn ROS         Other                      Anesthesia Plan    ASA 1     general     intravenous induction   Anesthetic plan, all risks, benefits, and alternatives have been provided, discussed and informed consent has been obtained with: patient.    Plan discussed with CRNA.

## 2019-07-30 ENCOUNTER — APPOINTMENT (OUTPATIENT)
Dept: GENERAL RADIOLOGY | Facility: HOSPITAL | Age: 49
End: 2019-07-30

## 2019-07-30 PROBLEM — Z98.890 S/P DEBRIDEMENT: Status: ACTIVE | Noted: 2019-07-30

## 2019-07-30 PROBLEM — F32.A DEPRESSION: Status: ACTIVE | Noted: 2019-07-30

## 2019-07-30 LAB
ANION GAP SERPL CALCULATED.3IONS-SCNC: 11 MMOL/L (ref 5–15)
BUN BLD-MCNC: 10 MG/DL (ref 6–20)
BUN/CREAT SERPL: 19.6 (ref 7–25)
CALCIUM SPEC-SCNC: 9.6 MG/DL (ref 8.6–10.5)
CHLORIDE SERPL-SCNC: 99 MMOL/L (ref 98–107)
CO2 SERPL-SCNC: 24 MMOL/L (ref 22–29)
CREAT BLD-MCNC: 0.51 MG/DL (ref 0.57–1)
DEPRECATED RDW RBC AUTO: 39.8 FL (ref 37–54)
ERYTHROCYTE [DISTWIDTH] IN BLOOD BY AUTOMATED COUNT: 11.8 % (ref 12.3–15.4)
GFR SERPL CREATININE-BSD FRML MDRD: 127 ML/MIN/1.73
GLUCOSE BLD-MCNC: 202 MG/DL (ref 65–99)
HCT VFR BLD AUTO: 34.3 % (ref 34–46.6)
HGB BLD-MCNC: 10.9 G/DL (ref 12–15.9)
MCH RBC QN AUTO: 29.9 PG (ref 26.6–33)
MCHC RBC AUTO-ENTMCNC: 31.8 G/DL (ref 31.5–35.7)
MCV RBC AUTO: 94 FL (ref 79–97)
PLATELET # BLD AUTO: 492 10*3/MM3 (ref 140–450)
PMV BLD AUTO: 10.1 FL (ref 6–12)
POTASSIUM BLD-SCNC: 4.6 MMOL/L (ref 3.5–5.2)
RBC # BLD AUTO: 3.65 10*6/MM3 (ref 3.77–5.28)
SODIUM BLD-SCNC: 134 MMOL/L (ref 136–145)
WBC NRBC COR # BLD: 10.62 10*3/MM3 (ref 3.4–10.8)

## 2019-07-30 PROCEDURE — 25010000002 PIPERACILLIN SOD-TAZOBACTAM PER 1 G: Performed by: ORTHOPAEDIC SURGERY

## 2019-07-30 PROCEDURE — 74018 RADEX ABDOMEN 1 VIEW: CPT

## 2019-07-30 PROCEDURE — 71045 X-RAY EXAM CHEST 1 VIEW: CPT

## 2019-07-30 PROCEDURE — 97530 THERAPEUTIC ACTIVITIES: CPT

## 2019-07-30 PROCEDURE — 85027 COMPLETE CBC AUTOMATED: CPT | Performed by: INTERNAL MEDICINE

## 2019-07-30 PROCEDURE — 80048 BASIC METABOLIC PNL TOTAL CA: CPT | Performed by: ORTHOPAEDIC SURGERY

## 2019-07-30 PROCEDURE — 97166 OT EVAL MOD COMPLEX 45 MIN: CPT

## 2019-07-30 PROCEDURE — 25010000002 VANCOMYCIN 10 G RECONSTITUTED SOLUTION: Performed by: ORTHOPAEDIC SURGERY

## 2019-07-30 PROCEDURE — 25010000002 PROMETHAZINE PER 50 MG: Performed by: INTERNAL MEDICINE

## 2019-07-30 RX ORDER — L.ACID,PARA/B.BIFIDUM/S.THERM 8B CELL
1 CAPSULE ORAL DAILY
Status: DISCONTINUED | OUTPATIENT
Start: 2019-07-30 | End: 2019-07-31 | Stop reason: HOSPADM

## 2019-07-30 RX ORDER — DEXTROSE AND SODIUM CHLORIDE 5; .45 G/100ML; G/100ML
70 INJECTION, SOLUTION INTRAVENOUS CONTINUOUS
Status: DISCONTINUED | OUTPATIENT
Start: 2019-07-30 | End: 2019-07-31 | Stop reason: HOSPADM

## 2019-07-30 RX ORDER — PROMETHAZINE HYDROCHLORIDE 25 MG/ML
12.5 INJECTION, SOLUTION INTRAMUSCULAR; INTRAVENOUS ONCE
Status: COMPLETED | OUTPATIENT
Start: 2019-07-30 | End: 2019-07-30

## 2019-07-30 RX ADMIN — PROMETHAZINE HYDROCHLORIDE 12.5 MG: 25 INJECTION, SOLUTION INTRAMUSCULAR; INTRAVENOUS at 01:56

## 2019-07-30 RX ADMIN — Medication 250 MG: at 08:45

## 2019-07-30 RX ADMIN — TAZOBACTAM SODIUM AND PIPERACILLIN SODIUM 3.38 G: 375; 3 INJECTION, SOLUTION INTRAVENOUS at 05:49

## 2019-07-30 RX ADMIN — DOCUSATE SODIUM 100 MG: 100 CAPSULE, LIQUID FILLED ORAL at 08:45

## 2019-07-30 RX ADMIN — TAZOBACTAM SODIUM AND PIPERACILLIN SODIUM 3.38 G: 375; 3 INJECTION, SOLUTION INTRAVENOUS at 13:33

## 2019-07-30 RX ADMIN — Medication 250 MG: at 21:28

## 2019-07-30 RX ADMIN — Medication 1 CAPSULE: at 10:40

## 2019-07-30 RX ADMIN — DEXTROSE AND SODIUM CHLORIDE 70 ML/HR: 5; 450 INJECTION, SOLUTION INTRAVENOUS at 01:56

## 2019-07-30 RX ADMIN — VANCOMYCIN HYDROCHLORIDE 1250 MG: 10 INJECTION, POWDER, LYOPHILIZED, FOR SOLUTION INTRAVENOUS at 10:40

## 2019-07-30 RX ADMIN — TAZOBACTAM SODIUM AND PIPERACILLIN SODIUM 3.38 G: 375; 3 INJECTION, SOLUTION INTRAVENOUS at 21:28

## 2019-07-30 RX ADMIN — BUPROPION HYDROCHLORIDE 150 MG: 150 TABLET, FILM COATED, EXTENDED RELEASE ORAL at 08:45

## 2019-07-30 RX ADMIN — HYDROCODONE BITARTRATE AND ACETAMINOPHEN 1 TABLET: 10; 325 TABLET ORAL at 21:28

## 2019-07-30 NOTE — ANESTHESIA POSTPROCEDURE EVALUATION
Patient: Ysabel Rasheed    Procedure Summary     Date:  07/29/19 [facility] / [facility]    Anesthesia Start:  1925 Anesthesia Stop:  2050    Procedure:  INCISION AND DRAINAGE UPPER EXTREMITY (Right Arm Upper) Diagnosis:      Surgeon:  Cayden Cm Jr., MD Provider:  Demarcus Durham MD    Anesthesia Type:  general ASA Status:  1          Anesthesia Type: general  Last vitals  BP       Temp       Pulse       Resp         SpO2         Post Anesthesia Care and Evaluation    Patient location during evaluation: PACU  Patient participation: complete - patient participated  Level of consciousness: awake and alert  Pain score: 0  Pain management: adequate  Airway patency: patent  Anesthetic complications: No anesthetic complications  PONV Status: none  Cardiovascular status: hemodynamically stable and acceptable  Respiratory status: nonlabored ventilation, acceptable and nasal cannula  Hydration status: acceptable

## 2019-07-31 VITALS
WEIGHT: 172.3 LBS | OXYGEN SATURATION: 90 % | HEART RATE: 81 BPM | BODY MASS INDEX: 27.69 KG/M2 | SYSTOLIC BLOOD PRESSURE: 114 MMHG | RESPIRATION RATE: 16 BRPM | TEMPERATURE: 98.7 F | DIASTOLIC BLOOD PRESSURE: 69 MMHG | HEIGHT: 66 IN

## 2019-07-31 LAB
ANION GAP SERPL CALCULATED.3IONS-SCNC: 13 MMOL/L (ref 5–15)
BUN BLD-MCNC: 20 MG/DL (ref 6–20)
BUN/CREAT SERPL: 17.2 (ref 7–25)
CALCIUM SPEC-SCNC: 9.1 MG/DL (ref 8.6–10.5)
CHLORIDE SERPL-SCNC: 104 MMOL/L (ref 98–107)
CO2 SERPL-SCNC: 25 MMOL/L (ref 22–29)
CREAT BLD-MCNC: 1.16 MG/DL (ref 0.57–1)
GFR SERPL CREATININE-BSD FRML MDRD: 49 ML/MIN/1.73
GLUCOSE BLD-MCNC: 100 MG/DL (ref 65–99)
POTASSIUM BLD-SCNC: 3.9 MMOL/L (ref 3.5–5.2)
SODIUM BLD-SCNC: 142 MMOL/L (ref 136–145)

## 2019-07-31 PROCEDURE — 25010000002 VANCOMYCIN 10 G RECONSTITUTED SOLUTION: Performed by: ORTHOPAEDIC SURGERY

## 2019-07-31 PROCEDURE — 80048 BASIC METABOLIC PNL TOTAL CA: CPT

## 2019-07-31 PROCEDURE — 97110 THERAPEUTIC EXERCISES: CPT

## 2019-07-31 PROCEDURE — 97162 PT EVAL MOD COMPLEX 30 MIN: CPT

## 2019-07-31 PROCEDURE — 97535 SELF CARE MNGMENT TRAINING: CPT

## 2019-07-31 PROCEDURE — 25010000002 CEFTRIAXONE PER 250 MG: Performed by: INTERNAL MEDICINE

## 2019-07-31 PROCEDURE — 25010000002 PIPERACILLIN SOD-TAZOBACTAM PER 1 G: Performed by: ORTHOPAEDIC SURGERY

## 2019-07-31 RX ORDER — MINOCYCLINE HYDROCHLORIDE 50 MG/1
50 CAPSULE ORAL EVERY 12 HOURS SCHEDULED
Status: DISCONTINUED | OUTPATIENT
Start: 2019-07-31 | End: 2019-07-31

## 2019-07-31 RX ORDER — HYDROCODONE BITARTRATE AND ACETAMINOPHEN 10; 325 MG/1; MG/1
1 TABLET ORAL EVERY 4 HOURS PRN
Qty: 30 TABLET | Refills: 0 | Status: SHIPPED | OUTPATIENT
Start: 2019-07-31

## 2019-07-31 RX ORDER — PSEUDOEPHEDRINE HCL 30 MG
100 TABLET ORAL 2 TIMES DAILY PRN
Qty: 60 EACH | Refills: 0 | Status: SHIPPED | OUTPATIENT
Start: 2019-07-31

## 2019-07-31 RX ORDER — TIZANIDINE 4 MG/1
4 TABLET ORAL EVERY 8 HOURS PRN
Status: DISCONTINUED | OUTPATIENT
Start: 2019-07-31 | End: 2019-07-31 | Stop reason: HOSPADM

## 2019-07-31 RX ORDER — METRONIDAZOLE 500 MG/1
500 TABLET ORAL 3 TIMES DAILY
Qty: 30 TABLET | Refills: 0 | Status: SHIPPED | OUTPATIENT
Start: 2019-07-31 | End: 2019-08-10

## 2019-07-31 RX ORDER — TIZANIDINE 4 MG/1
4 TABLET ORAL EVERY 8 HOURS PRN
Qty: 30 TABLET | Refills: 0 | Status: SHIPPED | OUTPATIENT
Start: 2019-07-31

## 2019-07-31 RX ADMIN — CEFTRIAXONE SODIUM 2 G: 2 INJECTION, POWDER, FOR SOLUTION INTRAMUSCULAR; INTRAVENOUS at 08:01

## 2019-07-31 RX ADMIN — Medication 1 CAPSULE: at 08:01

## 2019-07-31 RX ADMIN — TAZOBACTAM SODIUM AND PIPERACILLIN SODIUM 3.38 G: 375; 3 INJECTION, SOLUTION INTRAVENOUS at 06:15

## 2019-07-31 RX ADMIN — BUPROPION HYDROCHLORIDE 150 MG: 150 TABLET, FILM COATED, EXTENDED RELEASE ORAL at 09:09

## 2019-07-31 RX ADMIN — METRONIDAZOLE 500 MG: 500 INJECTION, SOLUTION INTRAVENOUS at 09:09

## 2019-07-31 RX ADMIN — VANCOMYCIN HYDROCHLORIDE 1250 MG: 10 INJECTION, POWDER, LYOPHILIZED, FOR SOLUTION INTRAVENOUS at 02:13

## 2019-07-31 RX ADMIN — DOCUSATE SODIUM 100 MG: 100 CAPSULE, LIQUID FILLED ORAL at 08:01

## 2019-07-31 RX ADMIN — Medication 250 MG: at 08:01

## 2019-07-31 RX ADMIN — TIZANIDINE 4 MG: 4 TABLET ORAL at 12:18

## 2019-07-31 RX ADMIN — MAGNESIUM HYDROXIDE 10 ML: 2400 SUSPENSION ORAL at 11:12

## 2019-08-01 ENCOUNTER — TRANSCRIBE ORDERS (OUTPATIENT)
Dept: ADMINISTRATIVE | Facility: HOSPITAL | Age: 49
End: 2019-08-01

## 2019-08-01 DIAGNOSIS — T84.610D: Primary | ICD-10-CM

## 2019-08-01 LAB
BACTERIA SPEC AEROBE CULT: ABNORMAL
BACTERIA SPEC AEROBE CULT: ABNORMAL
GRAM STN SPEC: ABNORMAL

## 2019-08-02 ENCOUNTER — HOSPITAL ENCOUNTER (OUTPATIENT)
Dept: INFUSION THERAPY | Facility: HOSPITAL | Age: 49
Discharge: HOME | End: 2019-08-02
Admitting: INTERNAL MEDICINE

## 2019-08-02 VITALS — DIASTOLIC BLOOD PRESSURE: 103 MMHG | SYSTOLIC BLOOD PRESSURE: 143 MMHG | HEART RATE: 91 BPM

## 2019-08-02 PROCEDURE — C1894 INTRO/SHEATH, NON-LASER: HCPCS

## 2019-08-02 PROCEDURE — C1751 CATH, INF, PER/CENT/MIDLINE: HCPCS

## 2019-08-02 RX ORDER — OXYCODONE HYDROCHLORIDE 5 MG/1
5 TABLET ORAL EVERY 4 HOURS PRN
Refills: 0 | COMMUNITY
Start: 2019-07-11

## 2019-08-02 RX ORDER — LORAZEPAM 0.5 MG/1
TABLET ORAL
COMMUNITY
Start: 2019-08-01

## 2019-08-02 RX ORDER — SODIUM CHLORIDE 0.9 % (FLUSH) 0.9 %
10 SYRINGE (ML) INJECTION AS NEEDED
Status: DISCONTINUED | OUTPATIENT
Start: 2019-08-02 | End: 2019-08-04 | Stop reason: HOSPADM

## 2019-08-02 RX ORDER — SODIUM CHLORIDE 0.9 % (FLUSH) 0.9 %
10 SYRINGE (ML) INJECTION EVERY 12 HOURS SCHEDULED
Status: DISCONTINUED | OUTPATIENT
Start: 2019-08-02 | End: 2019-08-04 | Stop reason: HOSPADM

## 2019-08-02 RX ORDER — ONDANSETRON 4 MG/1
4 TABLET, FILM COATED ORAL EVERY 8 HOURS PRN
Refills: 0 | COMMUNITY
Start: 2019-07-11

## 2019-08-02 RX ORDER — PROBENECID 500 MG/1
TABLET, FILM COATED ORAL
COMMUNITY
Start: 2019-08-01

## 2019-08-02 NOTE — PROGRESS NOTES
Pt discharged from IR dept s/p picc line placement.  Pt tolerated procedure with some reported anxiety which is now resolving.  Education provided by [facility] nurses.  Pt left unit ambulatory with mother to head to [facility] for infusion and further instructions.

## 2019-08-03 LAB
BACTERIA SPEC ANAEROBE CULT: NORMAL
BACTERIA SPEC ANAEROBE CULT: NORMAL

## 2019-08-08 ENCOUNTER — TRANSCRIBE ORDERS (OUTPATIENT)
Dept: LAB | Facility: HOSPITAL | Age: 49
End: 2019-08-08

## 2019-08-08 ENCOUNTER — LAB (OUTPATIENT)
Dept: LAB | Facility: HOSPITAL | Age: 49
End: 2019-08-08

## 2019-08-08 DIAGNOSIS — L03.113 CELLULITIS OF RIGHT HAND EXCLUDING FINGERS AND THUMB: ICD-10-CM

## 2019-08-08 DIAGNOSIS — T84.610D INFLAMMATORY REACTION DUE TO INTERNAL FIXATION DEVICE OF RIGHT HUMERUS, SUBSEQUENT ENCOUNTER: Primary | ICD-10-CM

## 2019-08-08 DIAGNOSIS — T84.610D INFLAMMATORY REACTION DUE TO INTERNAL FIXATION DEVICE OF RIGHT HUMERUS, SUBSEQUENT ENCOUNTER: ICD-10-CM

## 2019-08-08 LAB
ALBUMIN SERPL-MCNC: 4.2 G/DL (ref 3.5–5.2)
ALBUMIN/GLOB SERPL: 1.1 G/DL
ALP SERPL-CCNC: 166 U/L (ref 39–117)
ALT SERPL W P-5'-P-CCNC: 40 U/L (ref 1–33)
ANION GAP SERPL CALCULATED.3IONS-SCNC: 11 MMOL/L (ref 5–15)
AST SERPL-CCNC: 34 U/L (ref 1–32)
BASOPHILS # BLD AUTO: 0.06 10*3/MM3 (ref 0–0.2)
BASOPHILS NFR BLD AUTO: 0.6 % (ref 0–1.5)
BILIRUB SERPL-MCNC: 0.2 MG/DL (ref 0.2–1.2)
BUN BLD-MCNC: 16 MG/DL (ref 6–20)
BUN/CREAT SERPL: 22.9 (ref 7–25)
CALCIUM SPEC-SCNC: 9.7 MG/DL (ref 8.6–10.5)
CHLORIDE SERPL-SCNC: 101 MMOL/L (ref 98–107)
CO2 SERPL-SCNC: 23 MMOL/L (ref 22–29)
CREAT BLD-MCNC: 0.7 MG/DL (ref 0.57–1)
CRP SERPL-MCNC: 0.37 MG/DL (ref 0–0.5)
DEPRECATED RDW RBC AUTO: 39.9 FL (ref 37–54)
EOSINOPHIL # BLD AUTO: 0.25 10*3/MM3 (ref 0–0.4)
EOSINOPHIL NFR BLD AUTO: 2.3 % (ref 0.3–6.2)
ERYTHROCYTE [DISTWIDTH] IN BLOOD BY AUTOMATED COUNT: 11.9 % (ref 12.3–15.4)
ERYTHROCYTE [SEDIMENTATION RATE] IN BLOOD: 51 MM/HR (ref 0–30)
GFR SERPL CREATININE-BSD FRML MDRD: 88 ML/MIN/1.73
GLOBULIN UR ELPH-MCNC: 3.8 GM/DL
GLUCOSE BLD-MCNC: 120 MG/DL (ref 65–99)
HCT VFR BLD AUTO: 37 % (ref 34–46.6)
HGB BLD-MCNC: 11.9 G/DL (ref 12–15.9)
IMM GRANULOCYTES # BLD AUTO: 0.04 10*3/MM3 (ref 0–0.05)
IMM GRANULOCYTES NFR BLD AUTO: 0.4 % (ref 0–0.5)
LYMPHOCYTES # BLD AUTO: 2.97 10*3/MM3 (ref 0.7–3.1)
LYMPHOCYTES NFR BLD AUTO: 27.4 % (ref 19.6–45.3)
MCH RBC QN AUTO: 29.6 PG (ref 26.6–33)
MCHC RBC AUTO-ENTMCNC: 32.2 G/DL (ref 31.5–35.7)
MCV RBC AUTO: 92 FL (ref 79–97)
MONOCYTES # BLD AUTO: 0.71 10*3/MM3 (ref 0.1–0.9)
MONOCYTES NFR BLD AUTO: 6.6 % (ref 5–12)
NEUTROPHILS # BLD AUTO: 6.8 10*3/MM3 (ref 1.7–7)
NEUTROPHILS NFR BLD AUTO: 62.7 % (ref 42.7–76)
NRBC BLD AUTO-RTO: 0 /100 WBC (ref 0–0.2)
PLATELET # BLD AUTO: 482 10*3/MM3 (ref 140–450)
PMV BLD AUTO: 10.5 FL (ref 6–12)
POTASSIUM BLD-SCNC: 4 MMOL/L (ref 3.5–5.2)
PROT SERPL-MCNC: 8 G/DL (ref 6–8.5)
RBC # BLD AUTO: 4.02 10*6/MM3 (ref 3.77–5.28)
SODIUM BLD-SCNC: 135 MMOL/L (ref 136–145)
WBC NRBC COR # BLD: 10.83 10*3/MM3 (ref 3.4–10.8)

## 2019-08-08 PROCEDURE — 86140 C-REACTIVE PROTEIN: CPT

## 2019-08-08 PROCEDURE — 85025 COMPLETE CBC W/AUTO DIFF WBC: CPT

## 2019-08-08 PROCEDURE — 36415 COLL VENOUS BLD VENIPUNCTURE: CPT

## 2019-08-08 PROCEDURE — 80053 COMPREHEN METABOLIC PANEL: CPT

## 2019-08-08 PROCEDURE — 85652 RBC SED RATE AUTOMATED: CPT

## 2019-08-14 ENCOUNTER — TRANSCRIBE ORDERS (OUTPATIENT)
Dept: PHYSICAL THERAPY | Facility: HOSPITAL | Age: 49
End: 2019-08-14

## 2019-08-14 DIAGNOSIS — T81.43XA POSTOPERATIVE INTRA-ABDOMINAL ABSCESS: Primary | ICD-10-CM

## 2019-08-15 ENCOUNTER — LAB (OUTPATIENT)
Dept: LAB | Facility: HOSPITAL | Age: 49
End: 2019-08-15

## 2019-08-15 ENCOUNTER — TRANSCRIBE ORDERS (OUTPATIENT)
Dept: LAB | Facility: HOSPITAL | Age: 49
End: 2019-08-15

## 2019-08-15 ENCOUNTER — HOSPITAL ENCOUNTER (OUTPATIENT)
Dept: PHYSICAL THERAPY | Facility: HOSPITAL | Age: 49
Setting detail: THERAPIES SERIES
Discharge: HOME | End: 2019-08-15

## 2019-08-15 DIAGNOSIS — T84.610D INFLAMMATORY REACTION DUE TO INTERNAL FIXATION DEVICE OF RIGHT HUMERUS, SUBSEQUENT ENCOUNTER: Primary | ICD-10-CM

## 2019-08-15 DIAGNOSIS — L03.113 CELLULITIS OF RIGHT HAND EXCLUDING FINGERS AND THUMB: ICD-10-CM

## 2019-08-15 DIAGNOSIS — T81.89XD NONHEALING SURGICAL WOUND, SUBSEQUENT ENCOUNTER: ICD-10-CM

## 2019-08-15 DIAGNOSIS — T84.610D INFLAMMATORY REACTION DUE TO INTERNAL FIXATION DEVICE OF RIGHT HUMERUS, SUBSEQUENT ENCOUNTER: ICD-10-CM

## 2019-08-15 DIAGNOSIS — T81.41XD INFECTION OF SUPERFICIAL INCISIONAL SURGICAL SITE AFTER PROCEDURE, SUBSEQUENT ENCOUNTER: ICD-10-CM

## 2019-08-15 DIAGNOSIS — Z98.890 S/P DEBRIDEMENT: Primary | ICD-10-CM

## 2019-08-15 LAB
ALBUMIN SERPL-MCNC: 4.5 G/DL (ref 3.5–5.2)
ALBUMIN/GLOB SERPL: 1.6 G/DL
ALP SERPL-CCNC: 155 U/L (ref 39–117)
ALT SERPL W P-5'-P-CCNC: 64 U/L (ref 1–33)
ANION GAP SERPL CALCULATED.3IONS-SCNC: 11 MMOL/L (ref 5–15)
AST SERPL-CCNC: 38 U/L (ref 1–32)
BASOPHILS # BLD AUTO: 0.07 10*3/MM3 (ref 0–0.2)
BASOPHILS NFR BLD AUTO: 1 % (ref 0–1.5)
BILIRUB SERPL-MCNC: 0.3 MG/DL (ref 0.2–1.2)
BUN BLD-MCNC: 15 MG/DL (ref 6–20)
BUN/CREAT SERPL: 31.3 (ref 7–25)
CALCIUM SPEC-SCNC: 9.9 MG/DL (ref 8.6–10.5)
CHLORIDE SERPL-SCNC: 103 MMOL/L (ref 98–107)
CO2 SERPL-SCNC: 25 MMOL/L (ref 22–29)
CREAT BLD-MCNC: 0.48 MG/DL (ref 0.57–1)
CRP SERPL-MCNC: 0.08 MG/DL (ref 0–0.5)
DEPRECATED RDW RBC AUTO: 41.1 FL (ref 37–54)
EOSINOPHIL # BLD AUTO: 0.25 10*3/MM3 (ref 0–0.4)
EOSINOPHIL NFR BLD AUTO: 3.6 % (ref 0.3–6.2)
ERYTHROCYTE [DISTWIDTH] IN BLOOD BY AUTOMATED COUNT: 12 % (ref 12.3–15.4)
ERYTHROCYTE [SEDIMENTATION RATE] IN BLOOD: 26 MM/HR (ref 0–30)
GFR SERPL CREATININE-BSD FRML MDRD: 136 ML/MIN/1.73
GLOBULIN UR ELPH-MCNC: 2.9 GM/DL
GLUCOSE BLD-MCNC: 105 MG/DL (ref 65–99)
HCT VFR BLD AUTO: 36.9 % (ref 34–46.6)
HGB BLD-MCNC: 11.7 G/DL (ref 12–15.9)
IMM GRANULOCYTES # BLD AUTO: 0.01 10*3/MM3 (ref 0–0.05)
IMM GRANULOCYTES NFR BLD AUTO: 0.1 % (ref 0–0.5)
LYMPHOCYTES # BLD AUTO: 2.68 10*3/MM3 (ref 0.7–3.1)
LYMPHOCYTES NFR BLD AUTO: 39.1 % (ref 19.6–45.3)
MCH RBC QN AUTO: 29.5 PG (ref 26.6–33)
MCHC RBC AUTO-ENTMCNC: 31.7 G/DL (ref 31.5–35.7)
MCV RBC AUTO: 92.9 FL (ref 79–97)
MONOCYTES # BLD AUTO: 0.54 10*3/MM3 (ref 0.1–0.9)
MONOCYTES NFR BLD AUTO: 7.9 % (ref 5–12)
NEUTROPHILS # BLD AUTO: 3.3 10*3/MM3 (ref 1.7–7)
NEUTROPHILS NFR BLD AUTO: 48.3 % (ref 42.7–76)
NRBC BLD AUTO-RTO: 0 /100 WBC (ref 0–0.2)
PLATELET # BLD AUTO: 444 10*3/MM3 (ref 140–450)
PMV BLD AUTO: 10.7 FL (ref 6–12)
POTASSIUM BLD-SCNC: 4.3 MMOL/L (ref 3.5–5.2)
PROT SERPL-MCNC: 7.4 G/DL (ref 6–8.5)
RBC # BLD AUTO: 3.97 10*6/MM3 (ref 3.77–5.28)
SODIUM BLD-SCNC: 139 MMOL/L (ref 136–145)
WBC NRBC COR # BLD: 6.85 10*3/MM3 (ref 3.4–10.8)

## 2019-08-15 PROCEDURE — 80053 COMPREHEN METABOLIC PANEL: CPT

## 2019-08-15 PROCEDURE — 86140 C-REACTIVE PROTEIN: CPT

## 2019-08-15 PROCEDURE — 97162 PT EVAL MOD COMPLEX 30 MIN: CPT

## 2019-08-15 PROCEDURE — 97602 WOUND(S) CARE NON-SELECTIVE: CPT

## 2019-08-15 PROCEDURE — 85652 RBC SED RATE AUTOMATED: CPT

## 2019-08-15 PROCEDURE — 85025 COMPLETE CBC W/AUTO DIFF WBC: CPT

## 2019-08-15 PROCEDURE — 36415 COLL VENOUS BLD VENIPUNCTURE: CPT

## 2019-08-15 NOTE — THERAPY EVALUATION
Outpatient Rehabilitation - Wound/Debridement Initial Eval  [facility]     Patient Name: Ysabel Rasheed  : 1970  MRN: [id number]  Today's Date: 8/15/2019                  Admit Date: 8/15/2019    Visit Dx:    ICD-10-CM ICD-9-CM   1. S/P incision and drainage right upper extremity Z98.890 V45.89   2. Infection of superficial incisional surgical site after procedure, subsequent encounter T81.41XD V58.89     998.59   3. Nonhealing surgical wound, subsequent encounter T81.89XD V58.89     998.83       Patient Active Problem List   Diagnosis   • Post-operative infection   • S/P incision and drainage right upper extremity   • Depression        History reviewed. No pertinent past medical history.     Past Surgical History:   Procedure Laterality Date   • HYSTERECTOMY      44   • INCISION AND DRAINAGE ARM Right 2019    Procedure: INCISION AND DRAINAGE UPPER EXTREMITY RIGHT;  Surgeon: Cayden Cm Jr., MD;  Location: [facility];  Service: Orthopedics       Patient History     Row Name 08/15/19 143             History    Chief Complaint  Ulcer, wound or other skin conditions;Pain  -MF      Type of Pain  -- wound pain  -MF      Brief Description of Current Complaint  Pt fell and fractured RUE, now presents s/p i and d of R shoulder distal to incision  -MF      Patient/Caregiver Goals  Heal wound;Relieve pain  -MF      Patient's Rating of General Health  Good  -MF         Pain     Pain Location  Shoulder  -MF      Pain at Present  7  -MF      Pain at Best  4  -MF      Pain at Worst  9  -MF      Zarate-Lamas FACES Pain Rating   6  -MF         Daily Activities    Primary Language  English  -      Pt Participated in POC and Goals  Yes  -MF        User Key  (r) = Recorded By, (t) = Taken By, (c) = Cosigned By    Initials Name Provider Type    Albert Rosas, PT Physical Therapist          EVALUATION    LDA Wound     Row Name 08/15/19 1430             Wound 08/15/19 1430 Right anterior;distal  shoulder Abcess    Wound - Properties Group Date first assessed: 08/15/19  -MF Time first assessed: 1430  -MF Side: Right  -MF Orientation: anterior;distal  -MF Location: shoulder  -MF Primary Wound Type: Abcess  -MF    Wound Image  Images linked: 1  -MF      Dressing Appearance  intact;dried drainage  -MF      Closure  None  -MF      Base  moist;pink;slough;yellow  -MF      Black (%), Wound Tissue Color  0  -MF      Red (%), Wound Tissue Color  25  -MF      Yellow (%), Wound Tissue Color  75  -MF      Periwound  intact  -MF      Periwound Temperature  warm  -MF      Periwound Skin Turgor  soft  -MF      Edges  open  -MF      Wound Length (cm)  1.7 cm  -MF      Wound Width (cm)  0.7 cm  -MF      Wound Depth (cm)  0.7 cm  -MF      Drainage Characteristics/Odor  serosanguineous  -MF      Drainage Amount  small  -MF      Care, Wound  irrigated with;sterile normal saline;debrided nonsel debridement  -MF      Dressing Care, Wound  foam;low-adherent opticel Ag with optifoam to cover  -MF        User Key  (r) = Recorded By, (t) = Taken By, (c) = Cosigned By    Initials Name Provider Type    Albert Rosas, PT Physical Therapist            WOUND DEBRIDEMENT                   Therapy Education     Row Name 08/15/19 1430             Therapy Education    Education Details  discussed home management of wound and benefit of current dressing as well as delay with obtaining wound vac .   -MF      Given  Edema management;Bandaging/dressing change  -MF      Program  New  -MF      How Provided  Verbal  -MF      Provided to  Patient;Caregiver  -MF      Level of Understanding  Verbalized  -MF        User Key  (r) = Recorded By, (t) = Taken By, (c) = Cosigned By    Initials Name Provider Type    Albert Rosas, PT Physical Therapist          Recommendation and Plan  PT Assessment/Plan     Row Name 08/15/19 1430          PT Assessment    Functional Limitations  Limitation in home management;Limitations in community  activities;Limitations in functional capacity and performance;Performance in leisure activities;Performance in self-care ADL;Other (comment) wound care  -     Impairments  Pain;Integumentary integrity  -     Assessment Comments  Pt presents with complex wound s/p i and d with significant drainage. PTwas able to lightlly debride wound with 4x4 to help increase granulation and decrease bioburden to improve wound healing.   -     Rehab Potential  Good  -MF     Patient/caregiver participated in establishment of treatment plan and goals  Yes  -MF     Patient would benefit from skilled therapy intervention  Yes  -MF        PT Plan    PT Frequency  3x/week  -     Predicted Duration of Therapy Intervention (Therapy Eval)  6 weeks  -     Planned CPT's?  PT EVAL MOD COMPLELITY: 83334;PT NEG PRESS WOUND TO 50 SQCM 3: 25347;PT NONSELECT DEBRIDE 15 MIN: 84829;PT JERSON DEBRIDE OPEN WOUND UP TO 20 CM: 08542;PT NLFU MIST: [id number]  -     Physical Therapy Interventions (Optional Details)  wound care;patient/family education;bandaging  -     PT Plan Comments  PT to obtain wound vac, but pt may be able to stay with basic dressing if wound drainage managed well with current dressing  -       User Key  (r) = Recorded By, (t) = Taken By, (c) = Cosigned By    Initials Name Provider Type    Albert Rosas, PT Physical Therapist            Goals  PT OP Goals     Row Name 08/15/19 1430          PT Short Term Goals    STG Date to Achieve  09/29/19  -     STG 1  decrease wound size by 50% as evidence of wound healing   -     STG 2  increase granulation to > 50 % to improve wound healing   -        Long Term Goals    LTG Date to Achieve  11/13/19  -     LTG 1  decrease wound size by 90% as evidence of wound healing   -     LTG 2  increase granulation to > 90 % to improve wound healing   -     LTG 3  Pt independent with home management of wound.   -        Time Calculation    PT Goal Re-Cert Due Date  11/13/19   -       User Key  (r) = Recorded By, (t) = Taken By, (c) = Cosigned By    Initials Name Provider Type     Albert Briceno, PT Physical Therapist          Time Calculation: Start Time: 1430  Therapy Charges for Today     Code Description Service Date Service Provider Modifiers Qty    33493145519 HC NONSELECTIVE DEBRIDEMENT 8/15/2019 Albert Briceno, PT GP 1    [id number]  PT EVAL MOD COMPLEXITY 4 8/15/2019 Albert Briceno, PT GP 1                Albert Briceno, PT  8/15/2019

## 2019-08-16 ENCOUNTER — HOSPITAL ENCOUNTER (OUTPATIENT)
Dept: PHYSICAL THERAPY | Facility: HOSPITAL | Age: 49
Setting detail: THERAPIES SERIES
Discharge: HOME | End: 2019-08-16

## 2019-08-16 DIAGNOSIS — T81.89XD NONHEALING SURGICAL WOUND, SUBSEQUENT ENCOUNTER: ICD-10-CM

## 2019-08-16 DIAGNOSIS — T81.41XD INFECTION OF SUPERFICIAL INCISIONAL SURGICAL SITE AFTER PROCEDURE, SUBSEQUENT ENCOUNTER: ICD-10-CM

## 2019-08-16 DIAGNOSIS — Z98.890 S/P DEBRIDEMENT: Primary | ICD-10-CM

## 2019-08-16 PROCEDURE — 97597 DBRDMT OPN WND 1ST 20 CM/<: CPT

## 2019-08-16 NOTE — THERAPY TREATMENT NOTE
Outpatient Rehabilitation - Wound/Debridement Treatment Note  [facility]     Patient Name: Ysabel Rasheed  : 1970  MRN: [id number]  Today's Date: 2019                 Admit Date: 2019    Visit Dx:    ICD-10-CM ICD-9-CM   1. S/P incision and drainage right upper extremity Z98.890 V45.89   2. Infection of superficial incisional surgical site after procedure, subsequent encounter T81.41XD V58.89     998.59   3. Nonhealing surgical wound, subsequent encounter T81.89XD V58.89     998.83       Patient Active Problem List   Diagnosis   • Post-operative infection   • S/P incision and drainage right upper extremity   • Depression        No past medical history on file.     Past Surgical History:   Procedure Laterality Date   • HYSTERECTOMY      44   • INCISION AND DRAINAGE ARM Right 2019    Procedure: INCISION AND DRAINAGE UPPER EXTREMITY RIGHT;  Surgeon: Cayden Cm Jr., MD;  Location: Replaced by [facility];  Service: Orthopedics         EVALUATION  PT Ortho     Row Name 19 1100       Subjective Comments    Subjective Comments  Pt with increased C/o pain today with debridement. PT discussed with pt need to take pain meds prior to PT tx.   -       Subjective Pain    Able to rate subjective pain?  yes  -    Pre-Treatment Pain Level  2  -    Post-Treatment Pain Level  5  -    Subjective Pain Comment  8/10 with dressing removal and debridement.   -       Transfers    Sit-Stand [place] (Transfers)  independent  -    Stand-Sit [place] (Transfers)  independent  -    Comment (Transfers)  Pt seen sitting on edge of stretcher for tx.   -       Gait/Stairs Assessment/Training    [place] Level (Gait)  independent  -      User Key  (r) = Recorded By, (t) = Taken By, (c) = Cosigned By    Initials Name Provider Type    Albert Rosas, PT Physical Therapist          LDA Wound     Row Name 19 1100 08/15/19 1430          Wound 08/15/19 1430 Right anterior;distal  shoulder Abcess    Wound - Properties Group Date first assessed: 08/15/19  -MF Time first assessed: 1430  -MF Side: Right  -MF Orientation: anterior;distal  -MF Location: shoulder  -MF Primary Wound Type: Abcess  -MF    Wound Image  --  Images linked: 1  -MF     Dressing Appearance  intact;dried drainage  -MF  intact;dried drainage  -MF     Closure  --  None  -MF     Base  moist;pink;slough;yellow  -MF  moist;pink;slough;yellow  -MF     Black (%), Wound Tissue Color  --  0  -MF     Red (%), Wound Tissue Color  --  25  -MF     Yellow (%), Wound Tissue Color  --  75  -MF     Periwound  intact  -MF  intact  -MF     Periwound Temperature  warm  -MF  warm  -MF     Periwound Skin Turgor  soft  -MF  soft  -MF     Edges  open  -MF  open  -MF     Wound Length (cm)  --  1.7 cm  -MF     Wound Width (cm)  --  0.7 cm  -MF     Wound Depth (cm)  --  0.7 cm  -MF     Drainage Characteristics/Odor  serosanguineous  -MF  serosanguineous  -MF     Drainage Amount  moderate  -MF  small  -MF     Care, Wound  irrigated with;sterile normal saline;debrided  -MF  irrigated with;sterile normal saline;debrided nonsel debridement  -MF     Dressing Care, Wound  foam;low-adherent opticel Ag with optifoam to cover  -MF  foam;low-adherent opticel Ag with optifoam to cover  -MF       User Key  (r) = Recorded By, (t) = Taken By, (c) = Cosigned By    Initials Name Provider Type    Albert Rosas, PT Physical Therapist            WOUND DEBRIDEMENT  Total area of Debridement: ~2cm2  Debridement Site 1  Location- Site 1: R UE  Selective Debridement- Site 1: Wound Surface <20cmsq  Instruments- Site 1: tweezers  Excised Tissue Description- Site 1: minimum, slough  Bleeding- Site 1: seeping, held pressure, 1 minute             Therapy Education     Row Name 08/16/19 1100 08/15/19 1430          Therapy Education    Education Details  discussed need for pain meds prior to tx and benefit of debridement.   -MF  discussed home management of wound and  benefit of current dressing as well as delay with obtaining wound vac .   -MF     Given  Edema management;Bandaging/dressing change  -MF  Edema management;Bandaging/dressing change  -MF     Program  Reinforced  -MF  New  -MF     How Provided  Verbal  -MF  Verbal  -MF     Provided to  Patient;Caregiver  -MF  Patient;Caregiver  -MF     Level of Understanding  Verbalized  -MF  Verbalized  -MF       User Key  (r) = Recorded By, (t) = Taken By, (c) = Cosigned By    Initials Name Provider Type    MF Albert Briceno, PT Physical Therapist          Recommendation and Plan  PT Assessment/Plan     Row Name 08/16/19 1100 08/15/19 7700       PT Assessment    Functional Limitations  Limitation in home management;Limitations in community activities;Limitations in functional capacity and performance;Performance in leisure activities;Performance in self-care ADL;Other (comment) wound care  -MF  Limitation in home management;Limitations in community activities;Limitations in functional capacity and performance;Performance in leisure activities;Performance in self-care ADL;Other (comment) wound care  -MF    Impairments  Pain;Integumentary integrity  -MF  Pain;Integumentary integrity  -MF    Assessment Comments  PT able to debride a minimal amount of slough from wound bed. moderate amounts of antibiotic beads encountered and removed to allow for better packing of wound bed.  PT discussed with pt limited potential benefit of wound vac at this time due to pt's high anxiety level and pain level.  PT will cont with basic wound care at this time.   -  Pt presents with complex wound s/p i and d with significant drainage. PTwas able to lightlly debride wound with 4x4 to help increase granulation and decrease bioburden to improve wound healing.   -    Rehab Potential  --  Good  -MF    Patient/caregiver participated in establishment of treatment plan and goals  --  Yes  -    Patient would benefit from skilled therapy intervention  --   Yes  -       PT Plan    PT Frequency  --  3x/week  -    Predicted Duration of Therapy Intervention (Therapy Eval)  --  6 weeks  -    Planned CPT's?  --  PT EVAL MOD COMPLELITY: 07306;PT NEG PRESS WOUND TO 50 SQCM 3: 63921;PT NONSELECT DEBRIDE 15 MIN: 10927;PT JERSON DEBRIDE OPEN WOUND UP TO 20 CM: 89211;PT NLFU MIST: [id number]  -    Physical Therapy Interventions (Optional Details)  wound care;patient/family education  -  wound care;patient/family education;bandaging  -    PT Plan Comments  --  PT to obtain wound vac, but pt may be able to stay with basic dressing if wound drainage managed well with current dressing  -      User Key  (r) = Recorded By, (t) = Taken By, (c) = Cosigned By    Initials Name Provider Type     Albert Briceno, PT Physical Therapist          Goals  PT OP Goals     Row Name 08/16/19 1100 08/15/19 1430       PT Short Term Goals    STG Date to Achieve  --  09/29/19  -    STG 1  --  decrease wound size by 50% as evidence of wound healing   -    STG 2  --  increase granulation to > 50 % to improve wound healing   -       Long Term Goals    LTG Date to Achieve  --  11/13/19  -    LTG 1  --  decrease wound size by 90% as evidence of wound healing   -    LTG 2  --  increase granulation to > 90 % to improve wound healing   -    LTG 3  --  Pt independent with home management of wound.   -       Time Calculation    PT Goal Re-Cert Due Date  11/13/19  -  11/13/19  -      User Key  (r) = Recorded By, (t) = Taken By, (c) = Cosigned By    Initials Name Provider Type     Albert Briceno, PT Physical Therapist          PT Goal Re-Cert Due Date: 11/13/19            Time Calculation: Start Time: 1100  Therapy Charges for Today     Code Description Service Date Service Provider Modifiers Qty    10124957405 HC JERSON DEBRIDE OPEN WOUND UP TO 20CM 8/16/2019 Albert Briceno, PT GP 1                  Albert Briceno, PT  8/16/2019

## 2019-08-18 ENCOUNTER — HOSPITAL ENCOUNTER (OUTPATIENT)
Dept: PHYSICAL THERAPY | Facility: HOSPITAL | Age: 49
Setting detail: THERAPIES SERIES
Discharge: HOME | End: 2019-08-18

## 2019-08-18 DIAGNOSIS — T81.41XD INFECTION OF SUPERFICIAL INCISIONAL SURGICAL SITE AFTER PROCEDURE, SUBSEQUENT ENCOUNTER: ICD-10-CM

## 2019-08-18 DIAGNOSIS — Z98.890 S/P DEBRIDEMENT: Primary | ICD-10-CM

## 2019-08-18 DIAGNOSIS — T81.89XD NONHEALING SURGICAL WOUND, SUBSEQUENT ENCOUNTER: ICD-10-CM

## 2019-08-18 PROCEDURE — 97597 DBRDMT OPN WND 1ST 20 CM/<: CPT

## 2019-08-18 NOTE — THERAPY WOUND CARE TREATMENT
Outpatient Rehabilitation - Wound/Debridement Treatment Note  [facility]     Patient Name: Ysabel Rasheed  : 1970  MRN: [id number]  Today's Date: 2019                 Admit Date: 2019    Visit Dx:    ICD-10-CM ICD-9-CM   1. S/P incision and drainage right upper extremity Z98.890 V45.89   2. Infection of superficial incisional surgical site after procedure, subsequent encounter T81.41XD V58.89     998.59   3. Nonhealing surgical wound, subsequent encounter T81.89XD V58.89     998.83        Patient Active Problem List   Diagnosis   • Post-operative infection   • S/P incision and drainage right upper extremity   • Depression        No past medical history on file.     Past Surgical History:   Procedure Laterality Date   • HYSTERECTOMY      44   • INCISION AND DRAINAGE ARM Right 2019    Procedure: INCISION AND DRAINAGE UPPER EXTREMITY RIGHT;  Surgeon: Cayden Cm Jr., MD;  Location: [facility];  Service: Orthopedics         EVALUATION  PT Ortho     Row Name 19 0945       Subjective Comments    Subjective Comments  Pt states she took advil this morning prior to tx, still feeling anxious.  Reports she sees Dr. Cm on Wednesday this week and [facility] on Thursday.  -       Subjective Pain    Able to rate subjective pain?  yes  -    Pre-Treatment Pain Level  2  -    Post-Treatment Pain Level  5  -    Subjective Pain Comment  8/10 with wound irrigation and debridement  -       Transfers    Comment (Transfers)  seated EOB for tx  -       Gait/Stairs Assessment/Training    [place] Level (Gait)  independent  -    Row Name 19 1100       Subjective Comments    Subjective Comments  Pt with increased C/o pain today with debridement. PT discussed with pt need to take pain meds prior to PT tx.   -MF       Subjective Pain    Able to rate subjective pain?  yes  -MF    Pre-Treatment Pain Level  2  -MF    Post-Treatment Pain Level  5  -    Subjective Pain Comment   "8/10 with dressing removal and debridement.   -MF       Transfers    Sit-Stand [place] (Transfers)  independent  -MF    Stand-Sit [place] (Transfers)  independent  -MF    Comment (Transfers)  Pt seen sitting on edge of stretcher for tx.   -MF       Gait/Stairs Assessment/Training    [place] Level (Gait)  independent  -      User Key  (r) = Recorded By, (t) = Taken By, (c) = Cosigned By    Initials Name Provider Type    Albert Rosas, PT Physical Therapist    Ashley Singh, PT Physical Therapist          LDA Wound     Row Name 08/18/19 0945             Wound 08/15/19 1430 Right anterior;distal shoulder Abcess    Wound - Properties Group Date first assessed: 08/15/19  - Time first assessed: 1430  -MF Side: Right  -MF Orientation: anterior;distal  - Location: shoulder  -MF Primary Wound Type: Abcess  -MF    Wound Image  Images linked: 2  -JM      Dressing Appearance  intact;moist drainage  -      Base  moist;pink;slough;yellow  -      Periwound  intact  -      Periwound Temperature  warm  -      Periwound Skin Turgor  soft  -      Edges  open  -      Wound Length (cm)  2 cm  -      Wound Width (cm)  0.8 cm  -      Wound Depth (cm)  1.2 cm  -JM      Drainage Characteristics/Odor  serosanguineous  -      Drainage Amount  moderate  -      Care, Wound  irrigated with;sterile normal saline;debrided  -      Dressing Care, Wound  dressing applied;silver impregnated;hydrofiber;low-adherent;foam;border dressing opticell ag ribbon moistened with saline, 4\" optifoam  -        User Key  (r) = Recorded By, (t) = Taken By, (c) = Cosigned By    Initials Name Provider Type    Albert Rosas, PT Physical Therapist    Ashley Singh, PT Physical Therapist            WOUND DEBRIDEMENT  Total area of Debridement: 2 cmsq  Debridement Site 1  Location- Site 1: R UE  Selective Debridement- Site 1: Wound Surface <20cmsq  Instruments- Site 1: tweezers  Excised Tissue " Description- Site 1: scant, slough  Bleeding- Site 1: seeping, held pressure, 1 minute             Therapy Education     Row Name 08/18/19 0945             Therapy Education    Given  Edema management;Bandaging/dressing change  -      Program  Reinforced  -SEAN      How Provided  Verbal  -      Provided to  Patient;Caregiver  -SEAN      Level of Understanding  Verbalized  -        User Key  (r) = Recorded By, (t) = Taken By, (c) = Cosigned By    Initials Name Provider Type    Ashley Singh, PT Physical Therapist          Recommendation and Plan  PT Assessment/Plan     Row Name 08/18/19 0945          PT Assessment    Functional Limitations  Limitation in home management;Limitations in community activities;Limitations in functional capacity and performance;Performance in leisure activities;Performance in self-care ADL;Other (comment) wound care  -     Impairments  Pain;Integumentary integrity  -     Assessment Comments  Pt still extremely painful and anxious with tx, limiting debridement.  Continued with moist antimicrobial dressings.  Home wound vac order has been initiated, but pt pain will likely not allow for vac placement at present.  Will await MD recommendations at [facility] this week.  -        PT Plan    Physical Therapy Interventions (Optional Details)  wound care;patient/family education  -     PT Plan Comments  ?check on home vac order (signed order in chart)  -       User Key  (r) = Recorded By, (t) = Taken By, (c) = Cosigned By    Initials Name Provider Type    Ashley Singh, PT Physical Therapist          Goals  PT OP Goals     Row Name 08/18/19 0945          Time Calculation    PT Goal Re-Cert Due Date  11/13/19  -       User Key  (r) = Recorded By, (t) = Taken By, (c) = Cosigned By    Initials Name Provider Type    Ashley Singh, PT Physical Therapist          PT Goal Re-Cert Due Date: 11/13/19            Time Calculation: Start Time: 0945  Therapy Charges for Today      Code Description Service Date Service Provider Modifiers Qty    70460239798  JERSON DEBRIDE OPEN WOUND UP TO 20CM 8/18/2019 Ashley Cohen, PT GP 1                  Ashley Cohen, PT  8/18/2019

## 2019-08-21 ENCOUNTER — APPOINTMENT (OUTPATIENT)
Dept: PHYSICAL THERAPY | Facility: HOSPITAL | Age: 49
End: 2019-08-21

## 2019-08-25 ENCOUNTER — HOSPITAL ENCOUNTER (OUTPATIENT)
Dept: PHYSICAL THERAPY | Facility: HOSPITAL | Age: 49
Setting detail: THERAPIES SERIES
Discharge: HOME | End: 2019-08-25

## 2019-08-25 DIAGNOSIS — Z98.890 S/P DEBRIDEMENT: Primary | ICD-10-CM

## 2019-08-25 DIAGNOSIS — T81.89XD NONHEALING SURGICAL WOUND, SUBSEQUENT ENCOUNTER: ICD-10-CM

## 2019-08-25 PROCEDURE — 97597 DBRDMT OPN WND 1ST 20 CM/<: CPT | Performed by: PHYSICAL THERAPIST

## 2019-09-01 ENCOUNTER — HOSPITAL ENCOUNTER (OUTPATIENT)
Dept: PHYSICAL THERAPY | Facility: HOSPITAL | Age: 49
Setting detail: THERAPIES SERIES
Discharge: HOME | End: 2019-09-01

## 2019-09-01 DIAGNOSIS — Z98.890 S/P DEBRIDEMENT: Primary | ICD-10-CM

## 2019-09-01 DIAGNOSIS — T81.41XD INFECTION OF SUPERFICIAL INCISIONAL SURGICAL SITE AFTER PROCEDURE, SUBSEQUENT ENCOUNTER: ICD-10-CM

## 2019-09-01 DIAGNOSIS — T81.89XD NONHEALING SURGICAL WOUND, SUBSEQUENT ENCOUNTER: ICD-10-CM

## 2019-09-01 PROCEDURE — 97597 DBRDMT OPN WND 1ST 20 CM/<: CPT

## 2019-09-01 NOTE — THERAPY WOUND CARE TREATMENT
Outpatient Rehabilitation - Wound/Debridement Treatment Note  [facility]     Patient Name: Ysabel Rasheed  : 1970  MRN: [id number]  Today's Date: 2019                 Admit Date: 2019    Visit Dx:    ICD-10-CM ICD-9-CM   1. S/P incision and drainage right upper extremity Z98.890 V45.89   2. Nonhealing surgical wound, subsequent encounter T81.89XD V58.89     998.83   3. Infection of superficial incisional surgical site after procedure, subsequent encounter T81.41XD V58.89     998.59       Patient Active Problem List   Diagnosis   • Post-operative infection   • S/P incision and drainage right upper extremity   • Depression        No past medical history on file.     Past Surgical History:   Procedure Laterality Date   • HYSTERECTOMY      44   • INCISION AND DRAINAGE ARM Right 2019    Procedure: INCISION AND DRAINAGE UPPER EXTREMITY RIGHT;  Surgeon: Cayden Cm Jr., MD;  Location: [facility];  Service: Orthopedics         EVALUATION  PT Ortho     Row Name 19 1115       Subjective Comments    Subjective Comments  No complaints or changes since last tx. Sees Dr. Cm Wednesday, needs supplies for his dressing change.  -       Subjective Pain    Able to rate subjective pain?  yes  -MC    Pre-Treatment Pain Level  2  -MC    Post-Treatment Pain Level  2  -MC    Subjective Pain Comment  greatly increased pain v. anxiety with any tactile input  -MC       Transfers    Sit-Stand [place] (Transfers)  independent  -MC    Stand-Sit [place] (Transfers)  independent  -MC    Comment (Transfers)  seated in ARJO for tx  -       Gait/Stairs Assessment/Training    [place] Level (Gait)  independent  -      User Key  (r) = Recorded By, (t) = Taken By, (c) = Cosigned By    Initials Name Provider Type    Harmony Blancas, PT Physical Therapist          LDA Wound     Row Name 19 1115             Wound 08/15/19 1430 Right anterior;distal shoulder Abcess    Wound -  "Properties Group Date first assessed: 08/15/19  - Time first assessed: 1430  -MF Side: Right  - Orientation: anterior;distal  - Location: shoulder  -MF Primary Wound Type: Abcess  -MF    Wound Image  Images linked: 1  -MC      Dressing Appearance  intact;moist drainage  -      Base  moist;red;subcutaneous;granulating;yellow;slough scant yellow tissue remaining  -      Periwound  intact  -      Periwound Temperature  warm  -      Periwound Skin Turgor  soft  -      Edges  open  -      Wound Length (cm)  2.4 cm difference likely d/t interrater differences vs. enlargement  -      Wound Width (cm)  0.4 cm  -      Wound Depth (cm)  0.5 cm  -      Drainage Characteristics/Odor  serosanguineous  -      Drainage Amount  small  -      Care, Wound  cleansed with;wound cleanser;debrided  -      Dressing Care, Wound  dressing applied;silver impregnated;hydrofiber;low-adherent;foam;border dressing saline-moist opticell Ag+ ribbon, 4\" opti, spandage  -      Periwound Care, Wound  cleansed with pH balanced cleanser;dry periwound area maintained small piece primafix tape to secure top portion  -        User Key  (r) = Recorded By, (t) = Taken By, (c) = Cosigned By    Initials Name Provider Type    Albert Rosas, PT Physical Therapist    Harmony Blancas, PT Physical Therapist            WOUND DEBRIDEMENT  Total area of Debridement: 1 cm2  Debridement Site 1  Location- Site 1: R UE  Selective Debridement- Site 1: Wound Surface <20cmsq  Instruments- Site 1: tweezers  Excised Tissue Description- Site 1: minimum, slough  Bleeding- Site 1: seeping, held pressure, 1 minute             Therapy Education     Row Name 09/01/19 2347             Therapy Education    Education Details  Continue with current POC.  -      Given  Bandaging/dressing change  -      Program  Reinforced  -      How Provided  Verbal;Demonstration  -MC      Provided to  Patient;Caregiver  -      Level of " Understanding  Verbalized;Demonstrated  -        User Key  (r) = Recorded By, (t) = Taken By, (c) = Cosigned By    Initials Name Provider Type     Harmony Hernandez, PT Physical Therapist          Recommendation and Plan  PT Assessment/Plan     Row Name 09/01/19 1115          PT Assessment    Functional Limitations  Limitation in home management;Limitations in community activities;Limitations in functional capacity and performance;Performance in leisure activities;Performance in self-care ADL;Other (comment) wound mgmt  -     Impairments  Pain;Integumentary integrity  -     Assessment Comments  Pt with improved wound width and depth since last assessment. The wound base is clean, moist, and red, with minimal yellow connective tissue vs. slough remaining. Pt continues to be very anxious during treatment, limiting debridement of remaining nonviable tissue. Pt will continue to benefit from skilled PT wound care to promote healing.  -     Rehab Potential  Good  -     Patient/caregiver participated in establishment of treatment plan and goals  Yes  -     Patient would benefit from skilled therapy intervention  Yes  -        PT Plan    PT Frequency  1x/week  -     Physical Therapy Interventions (Optional Details)  patient/family education;wound care  -     PT Plan Comments  debridement, dressings  -       User Key  (r) = Recorded By, (t) = Taken By, (c) = Cosigned By    Initials Name Provider Type    Harmony Blancas, PT Physical Therapist          Goals  PT OP Goals     Row Name 09/01/19 1115          Time Calculation    PT Goal Re-Cert Due Date  11/13/19  -       User Key  (r) = Recorded By, (t) = Taken By, (c) = Cosigned By    Initials Name Provider Type    Harmony Blancas, PT Physical Therapist          PT Goal Re-Cert Due Date: 11/13/19            Time Calculation: Start Time: 1115  Therapy Charges for Today     Code Description Service Date Service Provider Modifiers Qty     [id number] [facility] DEBRIDE OPEN WOUND UP TO 20CM 9/1/2019 Harmony Hernandez, PT GP 1                  aHrmony Hernandez, PT  9/1/2019

## 2019-09-08 ENCOUNTER — HOSPITAL ENCOUNTER (OUTPATIENT)
Dept: PHYSICAL THERAPY | Facility: HOSPITAL | Age: 49
Setting detail: THERAPIES SERIES
Discharge: HOME | End: 2019-09-08

## 2019-09-08 DIAGNOSIS — Z98.890 S/P DEBRIDEMENT: Primary | ICD-10-CM

## 2019-09-08 DIAGNOSIS — T81.89XD NONHEALING SURGICAL WOUND, SUBSEQUENT ENCOUNTER: ICD-10-CM

## 2019-09-08 DIAGNOSIS — T81.41XD INFECTION OF SUPERFICIAL INCISIONAL SURGICAL SITE AFTER PROCEDURE, SUBSEQUENT ENCOUNTER: ICD-10-CM

## 2019-09-08 PROCEDURE — 97597 DBRDMT OPN WND 1ST 20 CM/<: CPT

## 2019-09-08 NOTE — THERAPY WOUND CARE TREATMENT
Outpatient Rehabilitation - Wound/Debridement Treatment Note  [facility]     Patient Name: Ysabel Rasheed  : 1970  MRN: [id number]  Today's Date: 2019                 Admit Date: 2019    Visit Dx:    ICD-10-CM ICD-9-CM   1. S/P incision and drainage right upper extremity Z98.890 V45.89   2. Nonhealing surgical wound, subsequent encounter T81.89XD V58.89     998.83   3. Infection of superficial incisional surgical site after procedure, subsequent encounter T81.41XD V58.89     998.59       Patient Active Problem List   Diagnosis   • Post-operative infection   • S/P incision and drainage right upper extremity   • Depression        No past medical history on file.     Past Surgical History:   Procedure Laterality Date   • HYSTERECTOMY      44   • INCISION AND DRAINAGE ARM Right 2019    Procedure: INCISION AND DRAINAGE UPPER EXTREMITY RIGHT;  Surgeon: Cayden Cm Jr., MD;  Location: [facility];  Service: Orthopedics         EVALUATION  PT Ortho     Row Name 19 1100       Subjective Comments    Subjective Comments  Pt cont to have c/o stiffness with improved pain, but some anxiety with dressing changes.   -MF       Subjective Pain    Able to rate subjective pain?  yes  -MF    Pre-Treatment Pain Level  2  -MF    Post-Treatment Pain Level  2  -MF       Transfers    Sit-Stand [place] (Transfers)  independent  -MF    Stand-Sit [place] (Transfers)  independent  -MF    Comment (Transfers)  seated on arjo chair   -MF       Gait/Stairs Assessment/Training    [place] Level (Gait)  independent  -MF      User Key  (r) = Recorded By, (t) = Taken By, (c) = Cosigned By    Initials Name Provider Type    MF Albert Briceno, PT Physical Therapist          LDA Wound     Row Name 19 1100             Wound 08/15/19 1430 Right anterior;distal shoulder Abcess    Wound - Properties Group Date first assessed: 08/15/19  -MF Time first assessed: 1430  -MF Side: Right  -MF  Orientation: anterior;distal  -MF Location: shoulder  -MF Primary Wound Type: Abcess  -MF    Wound Image  Images linked: 1  -MF      Dressing Appearance  intact;moist drainage  -MF      Base  moist;red;subcutaneous;granulating;yellow;slough  -MF      Periwound  intact  -MF      Periwound Temperature  warm  -MF      Periwound Skin Turgor  soft  -MF      Edges  open  -MF      Wound Length (cm)  2 cm  -MF      Wound Width (cm)  0.5 cm  -MF      Wound Depth (cm)  0.3 cm  -MF      Drainage Characteristics/Odor  serosanguineous  -MF      Drainage Amount  small  -MF      Care, Wound  irrigated with;sterile normal saline;debrided  -MF      Dressing Care, Wound  foam;low-adherent nsaline moistened opticel Ag with optifoam  -MF        User Key  (r) = Recorded By, (t) = Taken By, (c) = Cosigned By    Initials Name Provider Type    Albert Rosas, PT Physical Therapist            WOUND DEBRIDEMENT  Total area of Debridement: ~1cm2  Debridement Site 1  Location- Site 1: R UE  Selective Debridement- Site 1: Wound Surface <20cmsq  Instruments- Site 1: tweezers  Excised Tissue Description- Site 1: minimum, slough  Bleeding- Site 1: minimum, held pressure, 1 minute             Therapy Education     Row Name 09/08/19 1100             Therapy Education    Given  Bandaging/dressing change  -MF      Program  Reinforced  -MF      How Provided  Verbal;Demonstration  -MF      Provided to  Patient;Caregiver  -MF      Level of Understanding  Verbalized;Demonstrated  -MF        User Key  (r) = Recorded By, (t) = Taken By, (c) = Cosigned By    Initials Name Provider Type    Albert Rosas, PT Physical Therapist          Recommendation and Plan  PT Assessment/Plan     Row Name 09/08/19 1100          PT Assessment    Functional Limitations  Limitation in home management;Limitations in community activities;Limitations in functional capacity and performance;Performance in leisure activities;Performance in self-care ADL;Other  (comment)  -     Impairments  Pain;Integumentary integrity  -     Assessment Comments  Pt cont to have slow decrease in size and depth of wound with good reepithelialization noted .  -     Rehab Potential  Good  -     Patient/caregiver participated in establishment of treatment plan and goals  Yes  -     Patient would benefit from skilled therapy intervention  Yes  -        PT Plan    PT Frequency  1x/week  -     Physical Therapy Interventions (Optional Details)  wound care;patient/family education  -     PT Plan Comments  cont with debridement and dressing management   -       User Key  (r) = Recorded By, (t) = Taken By, (c) = Cosigned By    Initials Name Provider Type    Albert Rosas, PT Physical Therapist          Goals  PT OP Goals     Row Name 09/08/19 1100          Time Calculation    PT Goal Re-Cert Due Date  11/13/19  -       User Key  (r) = Recorded By, (t) = Taken By, (c) = Cosigned By    Initials Name Provider Type    Albert Rosas, PT Physical Therapist          PT Goal Re-Cert Due Date: 11/13/19            Time Calculation: Start Time: 1100  Therapy Charges for Today     Code Description Service Date Service Provider Modifiers Qty    [id number]  JERSON DEBRIDE OPEN WOUND UP TO 20CM 9/8/2019 Albert Briceno, PT GP 1                  Albert Briceno, PT  9/8/2019

## 2019-09-09 LAB
FUNGUS WND CULT: NORMAL
FUNGUS WND CULT: NORMAL
MYCOBACTERIUM SPEC CULT: NORMAL
MYCOBACTERIUM SPEC CULT: NORMAL
NIGHT BLUE STAIN TISS: NORMAL
NIGHT BLUE STAIN TISS: NORMAL

## 2019-09-15 ENCOUNTER — HOSPITAL ENCOUNTER (OUTPATIENT)
Dept: PHYSICAL THERAPY | Facility: HOSPITAL | Age: 49
Setting detail: THERAPIES SERIES
Discharge: HOME | End: 2019-09-15

## 2019-09-15 DIAGNOSIS — T81.89XD NONHEALING SURGICAL WOUND, SUBSEQUENT ENCOUNTER: ICD-10-CM

## 2019-09-15 DIAGNOSIS — T81.41XD INFECTION OF SUPERFICIAL INCISIONAL SURGICAL SITE AFTER PROCEDURE, SUBSEQUENT ENCOUNTER: ICD-10-CM

## 2019-09-15 DIAGNOSIS — Z98.890 S/P DEBRIDEMENT: Primary | ICD-10-CM

## 2019-09-15 PROCEDURE — 97597 DBRDMT OPN WND 1ST 20 CM/<: CPT

## 2019-09-15 NOTE — THERAPY WOUND CARE TREATMENT
Outpatient Rehabilitation - Wound/Debridement Treatment Note  [facility]     Patient Name: Ysabel Rasheed  : 1970  MRN: [id number]  Today's Date: 9/15/2019                 Admit Date: 9/15/2019    Visit Dx:    ICD-10-CM ICD-9-CM   1. S/P incision and drainage right upper extremity Z98.890 V45.89   2. Nonhealing surgical wound, subsequent encounter T81.89XD V58.89     998.83   3. Infection of superficial incisional surgical site after procedure, subsequent encounter T81.41XD V58.89     998.59       Patient Active Problem List   Diagnosis   • Post-operative infection   • S/P incision and drainage right upper extremity   • Depression        No past medical history on file.     Past Surgical History:   Procedure Laterality Date   • HYSTERECTOMY      44   • INCISION AND DRAINAGE ARM Right 2019    Procedure: INCISION AND DRAINAGE UPPER EXTREMITY RIGHT;  Surgeon: Cayden Cm Jr., MD;  Location: [facility];  Service: Orthopedics         EVALUATION  PT Ortho     Row Name 09/15/19 1115       Subjective Comments    Subjective Comments  Pt states she sees Dr. Marrufo this week, is hoping she doesn't need the ag dressings for too much longer so she can just use the optifoam and be able to change it herself.  Reports wound is not as tender during tx today.  -       Subjective Pain    Able to rate subjective pain?  yes  -SEAN    Pre-Treatment Pain Level  2  -    Post-Treatment Pain Level  2  -       Transfers    Sit-Stand [place] (Transfers)  independent  -    Stand-Sit [place] (Transfers)  independent  -    Comment (Transfers)  seated in arjo chair for tx  -       Gait/Stairs Assessment/Training    [place] Level (Gait)  independent  -      User Key  (r) = Recorded By, (t) = Taken By, (c) = Cosigned By    Initials Name Provider Type    Ashley Singh, PT Physical Therapist          MAIKOL Wound     Row Name 09/15/19 1115             Wound 08/15/19 1430 Right anterior;distal  "shoulder Abcess    Wound - Properties Group Date first assessed: 08/15/19  - Time first assessed: 1430  - Side: Right  - Orientation: anterior;distal  - Location: shoulder  - Primary Wound Type: Abcess  -MF    Dressing Appearance  intact;moist drainage  -      Base  moist;red;subcutaneous;granulating;yellow;slough  -      Periwound  intact;moist  -      Periwound Temperature  warm  -      Periwound Skin Turgor  soft  -      Edges  open  -      Wound Length (cm)  2.1 cm  -JM      Wound Width (cm)  0.7 cm  -JM      Wound Depth (cm)  0.3 cm  -JM      Drainage Characteristics/Odor  serosanguineous  -      Drainage Amount  small  -JM      Care, Wound  cleansed with;wound cleanser;debrided  -      Dressing Care, Wound  dressing applied;silver impregnated;hydrofiber;foam;border dressing lightly moistened opticel ag ribbon 1/2-width, 4\" optifoam  -      Periwound Care, Wound  cleansed with pH balanced cleanser;dry periwound area maintained  -        User Key  (r) = Recorded By, (t) = Taken By, (c) = Cosigned By    Initials Name Provider Type    MF Albert Briceno, PT Physical Therapist    JM Ashley Cohen, PT Physical Therapist            WOUND DEBRIDEMENT  Total area of Debridement: 1 cmsq  Debridement Site 1  Location- Site 1: R UE  Selective Debridement- Site 1: Wound Surface <20cmsq  Instruments- Site 1: tweezers  Excised Tissue Description- Site 1: scant, slough  Bleeding- Site 1: seeping, held pressure, 1 minute             Therapy Education     Row Name 09/15/19 7289             Therapy Education    Education Details  Discussed recommendation to have antimicrobial dressings and moist dressing management until wound completely closed  -JM      Given  Bandaging/dressing change  -      Program  Reinforced  -JM      How Provided  Verbal;Demonstration  -JM      Provided to  Patient;Caregiver  -JM      Level of Understanding  Verbalized;Demonstrated  -        User Key  (r) = " Recorded By, (t) = Taken By, (c) = Cosigned By    Initials Name Provider Type    Ashley Singh, PT Physical Therapist          Recommendation and Plan  PT Assessment/Plan     Row Name 09/15/19 1115          PT Assessment    Functional Limitations  Limitation in home management;Limitations in community activities;Limitations in functional capacity and performance;Performance in leisure activities;Performance in self-care ADL;Other (comment)  -     Impairments  Pain;Integumentary integrity  -     Assessment Comments  RUE wound measurements stable since last tx, but noted to have less slough on wound bed.  Slight hypergranulation noted, possibly from excess moisture, may benefit from change to mepilex ag foam next tx.  -        PT Plan    PT Frequency  1x/week  -     Physical Therapy Interventions (Optional Details)  wound care;patient/family education  -     PT Plan Comments  debridement, dressings management  -       User Key  (r) = Recorded By, (t) = Taken By, (c) = Cosigned By    Initials Name Provider Type    Ashley Singh, PT Physical Therapist          Goals  PT OP Goals     Row Name 09/15/19 1115          Time Calculation    PT Goal Re-Cert Due Date  11/13/19  -       User Key  (r) = Recorded By, (t) = Taken By, (c) = Cosigned By    Initials Name Provider Type    Ashley Singh, PT Physical Therapist          PT Goal Re-Cert Due Date: 11/13/19            Time Calculation: Start Time: 1115  Therapy Charges for Today     Code Description Service Date Service Provider Modifiers Qty    [id number] [facility] DEBRIDE OPEN WOUND UP TO 20CM 9/15/2019 Ashley Cohen, PT GP 1                  Ashley Cohen, PT  9/15/2019

## 2019-09-21 ENCOUNTER — HOSPITAL ENCOUNTER (OUTPATIENT)
Dept: PHYSICAL THERAPY | Facility: HOSPITAL | Age: 49
Setting detail: THERAPIES SERIES
Discharge: HOME | End: 2019-09-21

## 2019-09-21 DIAGNOSIS — T81.41XD INFECTION OF SUPERFICIAL INCISIONAL SURGICAL SITE AFTER PROCEDURE, SUBSEQUENT ENCOUNTER: ICD-10-CM

## 2019-09-21 DIAGNOSIS — T81.89XD NONHEALING SURGICAL WOUND, SUBSEQUENT ENCOUNTER: ICD-10-CM

## 2019-09-21 DIAGNOSIS — Z98.890 S/P DEBRIDEMENT: Primary | ICD-10-CM

## 2019-09-21 PROCEDURE — 97597 DBRDMT OPN WND 1ST 20 CM/<: CPT

## 2019-09-21 NOTE — THERAPY WOUND CARE TREATMENT
Outpatient Rehabilitation - Wound/Debridement Treatment Note  [facility]     Patient Name: Ysabel Rasheed  : 1970  MRN: [id number]  Today's Date: 2019                 Admit Date: 2019    Visit Dx:    ICD-10-CM ICD-9-CM   1. S/P incision and drainage right upper extremity Z98.890 V45.89   2. Nonhealing surgical wound, subsequent encounter T81.89XD V58.89     998.83   3. Infection of superficial incisional surgical site after procedure, subsequent encounter T81.41XD V58.89     998.59       Patient Active Problem List   Diagnosis   • Post-operative infection   • S/P incision and drainage right upper extremity   • Depression        No past medical history on file.     Past Surgical History:   Procedure Laterality Date   • HYSTERECTOMY      44   • INCISION AND DRAINAGE ARM Right 2019    Procedure: INCISION AND DRAINAGE UPPER EXTREMITY RIGHT;  Surgeon: Cayden Cm Jr., MD;  Location: [facility];  Service: Orthopedics         EVALUATION  PT Ortho     Row Name 19 1125       Subjective Comments    Subjective Comments  No complaints or changes since last treatment.  -       Subjective Pain    Able to rate subjective pain?  yes  -    Pre-Treatment Pain Level  2  -MC    Post-Treatment Pain Level  2  -MC       Transfers    Sit-Stand [place] (Transfers)  independent  -MC    Stand-Sit [place] (Transfers)  independent  -MC    Comment (Transfers)  seated in ARJO for tx  -       Gait/Stairs Assessment/Training    [place] Level (Gait)  independent  -      User Key  (r) = Recorded By, (t) = Taken By, (c) = Cosigned By    Initials Name Provider Type    Harmony Blancas PT Physical Therapist          [facility] Wound     Row Name 19 1125             Wound 08/15/19 1430 Right anterior;distal shoulder Abcess    Wound - Properties Group Date first assessed: 08/15/19  -MF Time first assessed: 1430  -MF Side: Right  -MF Orientation: anterior;distal  -MF Location: shoulder   "- Primary Wound Type: Abcess  -    Wound Image  Images linked: 1  -MC      Dressing Appearance  intact;moist drainage  -      Base  moist;red;granulating;other (see comments) mod hypergranulation  -      Periwound  intact;moist  -      Periwound Temperature  warm  -      Periwound Skin Turgor  soft  -      Edges  open  -      Wound Length (cm)  1.7 cm  -MC      Wound Width (cm)  0.5 cm  -      Wound Depth (cm)  0.1 cm Distal. Raised proximal  -      Drainage Characteristics/Odor  serosanguineous  -      Drainage Amount  small  -MC      Care, Wound  cleansed with;wound cleanser;debrided;silver agent applied Ag Nitrate to hypergranulation  -      Dressing Care, Wound  dressing applied;silver impregnated;low-adherent;foam;border dressing mepilex Ag, 4\" optifoam, primafix tape  -      Periwound Care, Wound  cleansed with pH balanced cleanser;dry periwound area maintained  -        User Key  (r) = Recorded By, (t) = Taken By, (c) = Cosigned By    Initials Name Provider Type    Albert Rosas, PT Physical Therapist    Harmony Blancas, PT Physical Therapist            WOUND DEBRIDEMENT  Total area of Debridement: 1 cm2  Debridement Site 1  Location- Site 1: R UE  Selective Debridement- Site 1: Wound Surface <20cmsq  Instruments- Site 1: tweezers  Excised Tissue Description- Site 1: minimum, slough  Bleeding- Site 1: scant, held pressure, 1 minute             Therapy Education     Row Name 09/21/19 0145             Therapy Education    Education Details  Switch dressing to Mepilex Ag due to decreased depth. Explained use of silver nitrate to address hypergranulation.  -MC      Given  Bandaging/dressing change  -      Program  Reinforced  -MC      How Provided  Verbal;Demonstration  -MC      Provided to  Patient;Caregiver  -MC      Level of Understanding  Verbalized;Demonstrated  -        User Key  (r) = Recorded By, (t) = Taken By, (c) = Cosigned By    Initials Name Provider Type "     Harmony Hernandez, PT Physical Therapist          Recommendation and Plan  PT Assessment/Plan     Row Name 09/21/19 1125          PT Assessment    Functional Limitations  Limitation in home management;Limitations in community activities;Limitations in functional capacity and performance;Performance in leisure activities;Performance in self-care ADL;Other (comment)  -     Impairments  Pain;Integumentary integrity  -     Assessment Comments  RUE wound much improved since last assessment, with decreased wound dimensions and no remaining nonviable tissue after debridement. Pt with minimal hypergranulation that required Ag Nitrate to address. Switched to mepilex Ag foam to maintain a moist, antibacterial wound environment while facilitating easier dressing changes at home. Pt will continue to benefit from skilled PT wound care to continue progress.  -     Rehab Potential  Good  -     Patient/caregiver participated in establishment of treatment plan and goals  Yes  -     Patient would benefit from skilled therapy intervention  Yes  -        PT Plan    PT Frequency  1x/week  -     Physical Therapy Interventions (Optional Details)  patient/family education;wound care  -     PT Plan Comments  debridement, dressings management  -       User Key  (r) = Recorded By, (t) = Taken By, (c) = Cosigned By    Initials Name Provider Type     Harmony Hernandez, PT Physical Therapist          Goals  PT OP Goals     Row Name 09/21/19 1125          Time Calculation    PT Goal Re-Cert Due Date  11/13/19  -       User Key  (r) = Recorded By, (t) = Taken By, (c) = Cosigned By    Initials Name Provider Type    Harmony Blancas, PT Physical Therapist          PT Goal Re-Cert Due Date: 11/13/19            Time Calculation: Start Time: 1125  Therapy Charges for Today     Code Description Service Date Service Provider Modifiers Qty    [id number]  JERSON DEBRIDE OPEN WOUND UP TO 20CM 9/21/2019 Harmony Hernandez,  PT GP 1                  Harmony Hernandez, PT  9/21/2019

## 2019-09-29 ENCOUNTER — HOSPITAL ENCOUNTER (OUTPATIENT)
Dept: PHYSICAL THERAPY | Facility: HOSPITAL | Age: 49
Setting detail: THERAPIES SERIES
Discharge: HOME | End: 2019-09-29

## 2019-09-29 DIAGNOSIS — Z98.890 S/P DEBRIDEMENT: ICD-10-CM

## 2019-09-29 DIAGNOSIS — T81.89XD NONHEALING SURGICAL WOUND, SUBSEQUENT ENCOUNTER: Primary | ICD-10-CM

## 2019-09-29 PROCEDURE — 97602 WOUND(S) CARE NON-SELECTIVE: CPT | Performed by: PHYSICAL THERAPIST

## 2019-09-29 NOTE — THERAPY WOUND CARE TREATMENT
"    Outpatient Rehabilitation - Wound/Debridement Progress Note  [facility]     Patient Name: Ysabel Rasheed  : 1970  MRN: [id number]  Today's Date: 2019                 Admit Date: 2019    Visit Dx:    ICD-10-CM ICD-9-CM   1. Nonhealing surgical wound, subsequent encounter T81.89XD V58.89     998.83   2. S/P incision and drainage right upper extremity Z98.890 V45.89       Patient Active Problem List   Diagnosis   • Post-operative infection   • S/P incision and drainage right upper extremity   • Depression        No past medical history on file.     Past Surgical History:   Procedure Laterality Date   • HYSTERECTOMY      44   • INCISION AND DRAINAGE ARM Right 2019    Procedure: INCISION AND DRAINAGE UPPER EXTREMITY RIGHT;  Surgeon: Cayden Cm Jr., MD;  Location: [facility];  Service: Orthopedics         PT Ortho     Row Name 19 1125       Subjective Comments    Subjective Comments  States ortho would like it to \"dry out\" more. Pt would like as little scarring as possible, but likes smaller dressing/ bandaid if possible   -MW       Subjective Pain    Able to rate subjective pain?  yes  -MW    Pre-Treatment Pain Level  0  -MW    Post-Treatment Pain Level  0  -MW    Subjective Pain Comment  no pain at wound; ache in shoulder / stiff in general   -MW       Transfers    Sit-Stand [place] (Transfers)  independent  -MW    Stand-Sit [place] (Transfers)  independent  -MW    Comment (Transfers)  seated for tx   -MW       Gait/Stairs Assessment/Training    [place] Level (Gait)  independent  -MW      User Key  (r) = Recorded By, (t) = Taken By, (c) = Cosigned By    Initials Name Provider Type    Marilin Sellers, PT Physical Therapist          LDA Wound     Row Name 19 1125             Wound 08/15/19 1430 Right anterior;distal shoulder Abcess    Wound - Properties Group Date first assessed: 08/15/19  -MF Time first assessed: 1430  -MF Side: Right  -MF Orientation: " anterior;distal  - Location: shoulder  - Primary Wound Type: Abcess  -MF    Wound Image  Images linked: 1  -MW      Dressing Appearance  intact;dried drainage  -MW      Base  moist;red;granulating;other (see comments);epithelialization min hypergranulation  -MW      Red (%), Wound Tissue Color  100  -MW      Periwound  intact;moist  -MW      Periwound Temperature  warm  -MW      Periwound Skin Turgor  soft  -MW      Edges  open  -MW      Wound Length (cm)  1 cm  -MW      Wound Width (cm)  0.3 cm  -MW      Wound Depth (cm)  -- 0.1 raised   -MW      Drainage Characteristics/Odor  serosanguineous  -MW      Drainage Amount  scant  -MW      Care, Wound  cleansed with;wound cleanser;debrided;silver agent applied Silver nitrate to hypergranulation tissue   -MW      Dressing Care, Wound  dressing changed;low-adherent;gauze Lg band aid   -MW      Periwound Care, Wound  cleansed with pH balanced cleanser;dry periwound area maintained  -MW        User Key  (r) = Recorded By, (t) = Taken By, (c) = Cosigned By    Initials Name Provider Type    MF Albert Briceno, PT Physical Therapist    MW Marilin Perez, PT Physical Therapist            WOUND DEBRIDEMENT  Total area of Debridement: non selective   Debridement Site 1  Location- Site 1: R UE  Selective Debridement- Site 1: Wound Surface <20cmsq(non selective )  Instruments- Site 1: other (comment)(wipes/ gauze )  Excised Tissue Description- Site 1: minimum, slough, other (comment)(periwound debris/ dried drainage )  Bleeding- Site 1: none             Therapy Education     Row Name 09/29/19 7255             Therapy Education    Education Details  Cont to cover to minimize risk of infx; flexibile fabric band aid type dressing.   -MW      Given  Bandaging/dressing change  -MW      Program  Modified  -MW      How Provided  Verbal  -MW      Provided to  Patient  -MW      Level of Understanding  Verbalized  -MW        User Key  (r) = Recorded By, (t) = Taken By, (c) =  Cosigned By    Initials Name Provider Type    Marilin Sellers, PT Physical Therapist          Recommendation and Plan  PT Assessment/Plan     Row Name 09/29/19 1125          PT Assessment    Functional Limitations  Limitation in home management;Limitations in community activities;Limitations in functional capacity and performance;Performance in leisure activities;Performance in self-care ADL;Other (comment)  -MW     Impairments  Pain;Integumentary integrity  -MW     Assessment Comments  RUE wound continues to decrease in dimensions. STG's have been met. Wound again with hypertrophic granulation tissue, which was treated with silver nitrate and covered with nonstick dressing. Follow up 1-2 more visits for dressing management and silver nitrate prn hypertrophic granulation tissue. Pt to keep dressing clean and dry.   -MW     Rehab Potential  Excellent  -MW     Patient/caregiver participated in establishment of treatment plan and goals  Yes  -MW     Patient would benefit from skilled therapy intervention  Yes  -MW        PT Plan    PT Frequency  1x/week  -MW     Predicted Duration of Therapy Intervention (Therapy Eval)  3 weeks   -MW     Planned CPT's?  PT JERSON DEBRIDE OPEN WOUND UP TO 20 CM: 84123;PT NONSELECT DEBRIDE 15 MIN: 86611;PT SELF CARE/HOME MGMT/TRAIN EA 15: 17868  -     Physical Therapy Interventions (Optional Details)  wound care;patient/family education  -     PT Plan Comments  debridement, dressings management; silver nitrate prn   -       User Key  (r) = Recorded By, (t) = Taken By, (c) = Cosigned By    Initials Name Provider Type    Marilin Sellers, PT Physical Therapist          Goals  PT OP Goals     Row Name 09/29/19 1125          STG Date to Achieve  09/29/19  -MW    STG 1  decrease wound size by 50% as evidence of wound healing   -MW    STG 1 Progress  Met  -MW    STG 2  increase granulation to > 50 % to improve wound healing   -MW    STG 2 Progress  Met  -MW          LTG Date to  Achieve  11/13/19  -MW    LTG 1  decrease wound size by 90% as evidence of wound healing   -MW    LTG 1 Progress  Partially Met  -MW    LTG 2  increase granulation to > 90 % to improve wound healing   -MW    LTG 2 Progress  Met  -MW    LTG 3  Pt independent with home management of wound.   -MW    LTG 3 Progress  Met  -MW          PT Goal Re-Cert Due Date  11/13/19  -MW      User Key  (r) = Recorded By, (t) = Taken By, (c) = Cosigned By    Initials Name Provider Type    Marilin Sellers, PT Physical Therapist            Time Calculation: Start Time: 1125  Therapy Charges for Today     Code Description Service Date Service Provider Modifiers Qty    [id number] HC NONSELECTIVE DEBRIDEMENT 9/29/2019 Marilin Perez, PT GP 1                  Marilin Perez, PT  9/29/2019

## 2019-10-08 ENCOUNTER — HOSPITAL ENCOUNTER (OUTPATIENT)
Dept: PHYSICAL THERAPY | Facility: HOSPITAL | Age: 49
Setting detail: THERAPIES SERIES
Discharge: HOME | End: 2019-10-08

## 2019-10-08 DIAGNOSIS — Z98.890 S/P DEBRIDEMENT: ICD-10-CM

## 2019-10-08 DIAGNOSIS — T81.89XD NONHEALING SURGICAL WOUND, SUBSEQUENT ENCOUNTER: Primary | ICD-10-CM

## 2019-10-08 DIAGNOSIS — T81.41XD INFECTION OF SUPERFICIAL INCISIONAL SURGICAL SITE AFTER PROCEDURE, SUBSEQUENT ENCOUNTER: ICD-10-CM

## 2019-10-08 PROCEDURE — 97535 SELF CARE MNGMENT TRAINING: CPT

## 2019-10-08 NOTE — THERAPY DISCHARGE NOTE
"     Outpatient Rehabilitation - Wound/Debridement Treatment Note &  Discharge Summary  [facility]     Patient Name: Ysabel Rasheed  : 1970  MRN: [id number]  Today's Date: 10/8/2019                  Admit Date: 10/8/2019    Visit Dx:    ICD-10-CM ICD-9-CM   1. Nonhealing surgical wound, subsequent encounter T81.89XD V58.89     998.83   2. S/P incision and drainage right upper extremity Z98.890 V45.89   3. Infection of superficial incisional surgical site after procedure, subsequent encounter T81.41XD V58.89     998.59       Patient Active Problem List   Diagnosis   • Post-operative infection   • S/P incision and drainage right upper extremity   • Depression        No past medical history on file.     Past Surgical History:   Procedure Laterality Date   • HYSTERECTOMY      44   • INCISION AND DRAINAGE ARM Right 2019    Procedure: INCISION AND DRAINAGE UPPER EXTREMITY RIGHT;  Surgeon: Cayden Cm Jr., MD;  Location: [facility];  Service: Orthopedics         EVALUATION  PT Ortho     Row Name 10/08/19 0879       Subjective Comments    Subjective Comments  Pt reports she has been using aquaphor and anything else she can find that says \"vitamin E\" on it, including mederma, to treat her scar. She wants to try to minimize her scarring as much as possible.  -       Subjective Pain    Able to rate subjective pain?  yes  -MC    Pre-Treatment Pain Level  0  -MC    Post-Treatment Pain Level  0  -MC       Transfers    Sit-Stand [place] (Transfers)  independent  -MC    Stand-Sit [place] (Transfers)  independent  -MC    Comment (Transfers)  seated for tx  -       Gait/Stairs Assessment/Training    [place] Level (Gait)  independent  -      User Key  (r) = Recorded By, (t) = Taken By, (c) = Cosigned By    Initials Name Provider Type    Harmony Blancas PT Physical Therapist              LDA Wound     Row Name 10/08/19 1145             [REMOVED] Wound 08/15/19 1430 Right " anterior;distal shoulder Abcess    Wound - Properties Group Date first assessed: 08/15/19  - Time first assessed: 1430  -MF Side: Right  -MF Orientation: anterior;distal  -MF Location: shoulder  -MF Primary Wound Type: Abcess  -MF Resolution Date: 10/08/19  - Resolution Time: 1145  -MC Wound Outcome: Healed  -    Dressing Appearance  open to air  -      Base  dry;closed/resurfaced;pink;epithelialization closed, pink/purple scarred epithelialization  -      Periwound  intact;dry  -      Periwound Temperature  warm  -      Periwound Skin Turgor  soft  -      Drainage Amount  none  -      Care, Wound  cleansed with;wound cleanser  -      Dressing Care, Wound  open to air  -        User Key  (r) = Recorded By, (t) = Taken By, (c) = Cosigned By    Initials Name Provider Type    Albert Rosas, PT Physical Therapist    Harmony Blancas, PT Physical Therapist            WOUND DEBRIDEMENT                   Therapy Education  Education Details: Continue to utilize topical agents as preferred, such as aquaphor, Mederma, with emphasis on Vitamins E and A for integumentary strengthening. Discussed scar massage as appropriate/tolerate to prevent/limit any adhesions and promote normalized motion. Encouraged pt to follow up with her MD about mild RUE swelling/puffiness, as it does not appear to be wound-related.  Given: Symptoms/condition management  Program: Progressed  How Provided: Verbal  Provided to: Patient  Level of Understanding: Verbalized  93173 - PT Self Care/Mgmt Minutes: 10    Therapy Education     Row Name 10/08/19 1145             Therapy Education    Education Details  Continue to utilize topical agents as preferred, such as aquaphor, Mederma, with emphasis on Vitamins E and A for integumentary strengthening. Discussed scar massage as appropriate/tolerate to prevent/limit any adhesions and promote normalized motion. Encouraged pt to follow up with her MD about mild RUE  swelling/puffiness, as it does not appear to be wound-related.  -MC      Given  Symptoms/condition management  -      Program  Progressed  -      How Provided  Verbal  -MC      Provided to  Patient  -      Level of Understanding  Verbalized  -      83133 - PT Self Care/Mgmt Minutes  10  -        User Key  (r) = Recorded By, (t) = Taken By, (c) = Cosigned By    Initials Name Provider Type    Harmony Blancas PT Physical Therapist          Recommendation and Plan  PT Assessment/Plan     Row Name 10/08/19 1145          PT Assessment    Functional Limitations  Limitation in home management;Limitations in community activities;Limitations in functional capacity and performance;Performance in leisure activities;Performance in self-care ADL;Other (comment) wound mgmt  -MC     Impairments  Pain;Integumentary integrity  -     Assessment Comments  Pt has met all her goals for PT wound care. The wound is completely closed at this time, and overlying epithelial integrity appears appropriate for this stage of healing. Performed extra education today regarding skin care, scar masage, and topical treatment options that may promote scar maturation and diminish the appearance of the scar.   -     Patient/caregiver participated in establishment of treatment plan and goals  Yes  -MC        PT Plan    PT Plan Comments  D/C  -       User Key  (r) = Recorded By, (t) = Taken By, (c) = Cosigned By    Initials Name Provider Type    Harmony Blancas PT Physical Therapist          Goals  PT OP Goals     Row Name 10/08/19 1145          PT Short Term Goals    STG Date to Achieve  09/29/19  -     STG 1  decrease wound size by 50% as evidence of wound healing   -     STG 1 Progress  Met  -     STG 2  increase granulation to > 50 % to improve wound healing   -     STG 2 Progress  Met  -        Long Term Goals    LTG Date to Achieve  11/13/19  -     LTG 1  decrease wound size by 90% as evidence of wound  healing   -     LTG 1 Progress  Met  -     LTG 2  increase granulation to > 90 % to improve wound healing   -     LTG 2 Progress  Met  -     LTG 3  Pt independent with home management of wound.   -     LTG 3 Progress  Met  -       User Key  (r) = Recorded By, (t) = Taken By, (c) = Cosigned By    Initials Name Provider Type    Harmony Blancas, PT Physical Therapist          Time Calculation: Start Time: 1145    Therapy Charges for Today     Code Description Service Date Service Provider Modifiers Qty    [id number]  PT SELF CARE/MGMT/TRAIN EA 15 MIN 10/8/2019 Harmony Hernandez, PT GP 1              OP Discharge Summary     Row Name 10/08/19 1145             OP PT Discharge Summary    Date of Discharge  10/08/19  -      Reason for Discharge  All goals achieved  -      Outcomes Achieved  Able to achieve all goals within established timeline  -      Discharge Destination  Home without follow-up  -        User Key  (r) = Recorded By, (t) = Taken By, (c) = Cosigned By    Initials Name Provider Type    Harmony Blancas PT Physical Therapist          Harmony Hernandez, GIOVANA  10/8/2019

## 2020-01-22 ENCOUNTER — TRANSCRIBE ORDERS (OUTPATIENT)
Dept: ADMINISTRATIVE | Facility: HOSPITAL | Age: 50
End: 2020-01-22

## 2020-01-22 DIAGNOSIS — Z12.31 VISIT FOR SCREENING MAMMOGRAM: Primary | ICD-10-CM

## 2020-03-31 ENCOUNTER — APPOINTMENT (OUTPATIENT)
Dept: MAMMOGRAPHY | Facility: HOSPITAL | Age: 50
End: 2020-03-31

## 2020-04-08 ENCOUNTER — TRANSCRIBE ORDERS (OUTPATIENT)
Dept: LAB | Facility: HOSPITAL | Age: 50
End: 2020-04-08

## 2020-04-08 ENCOUNTER — LAB (OUTPATIENT)
Dept: LAB | Facility: HOSPITAL | Age: 50
End: 2020-04-08

## 2020-04-08 DIAGNOSIS — R10.12 ABDOMINAL PAIN, LEFT UPPER QUADRANT: Primary | ICD-10-CM

## 2020-04-08 DIAGNOSIS — R10.12 ABDOMINAL PAIN, LEFT UPPER QUADRANT: ICD-10-CM

## 2020-04-08 LAB
ALBUMIN SERPL-MCNC: 5.2 G/DL (ref 3.5–5.2)
ALBUMIN/GLOB SERPL: 1.6 G/DL
ALP SERPL-CCNC: 103 U/L (ref 39–117)
ALT SERPL W P-5'-P-CCNC: 49 U/L (ref 1–33)
ANION GAP SERPL CALCULATED.3IONS-SCNC: 12.5 MMOL/L (ref 5–15)
AST SERPL-CCNC: 37 U/L (ref 1–32)
BACTERIA UR QL AUTO: ABNORMAL /HPF
BASOPHILS # BLD AUTO: 0.05 10*3/MM3 (ref 0–0.2)
BASOPHILS NFR BLD AUTO: 0.6 % (ref 0–1.5)
BILIRUB SERPL-MCNC: 0.3 MG/DL (ref 0.2–1.2)
BILIRUB UR QL STRIP: NEGATIVE
BUN BLD-MCNC: 14 MG/DL (ref 6–20)
BUN/CREAT SERPL: 17.3 (ref 7–25)
CALCIUM SPEC-SCNC: 10.1 MG/DL (ref 8.6–10.5)
CHLORIDE SERPL-SCNC: 100 MMOL/L (ref 98–107)
CLARITY UR: CLEAR
CO2 SERPL-SCNC: 26.5 MMOL/L (ref 22–29)
COLOR UR: YELLOW
CREAT BLD-MCNC: 0.81 MG/DL (ref 0.57–1)
DEPRECATED RDW RBC AUTO: 39.8 FL (ref 37–54)
EOSINOPHIL # BLD AUTO: 0.12 10*3/MM3 (ref 0–0.4)
EOSINOPHIL NFR BLD AUTO: 1.4 % (ref 0.3–6.2)
ERYTHROCYTE [DISTWIDTH] IN BLOOD BY AUTOMATED COUNT: 12.4 % (ref 12.3–15.4)
GFR SERPL CREATININE-BSD FRML MDRD: 74 ML/MIN/1.73
GLOBULIN UR ELPH-MCNC: 3.3 GM/DL
GLUCOSE BLD-MCNC: 95 MG/DL (ref 65–99)
GLUCOSE UR STRIP-MCNC: NEGATIVE MG/DL
HCT VFR BLD AUTO: 43.6 % (ref 34–46.6)
HGB BLD-MCNC: 14.9 G/DL (ref 12–15.9)
HGB UR QL STRIP.AUTO: NEGATIVE
HYALINE CASTS UR QL AUTO: ABNORMAL /LPF
IMM GRANULOCYTES # BLD AUTO: 0.02 10*3/MM3 (ref 0–0.05)
IMM GRANULOCYTES NFR BLD AUTO: 0.2 % (ref 0–0.5)
KETONES UR QL STRIP: NEGATIVE
LEUKOCYTE ESTERASE UR QL STRIP.AUTO: ABNORMAL
LYMPHOCYTES # BLD AUTO: 2.55 10*3/MM3 (ref 0.7–3.1)
LYMPHOCYTES NFR BLD AUTO: 28.7 % (ref 19.6–45.3)
MCH RBC QN AUTO: 30.3 PG (ref 26.6–33)
MCHC RBC AUTO-ENTMCNC: 34.2 G/DL (ref 31.5–35.7)
MCV RBC AUTO: 88.6 FL (ref 79–97)
MONOCYTES # BLD AUTO: 0.82 10*3/MM3 (ref 0.1–0.9)
MONOCYTES NFR BLD AUTO: 9.2 % (ref 5–12)
NEUTROPHILS # BLD AUTO: 5.32 10*3/MM3 (ref 1.7–7)
NEUTROPHILS NFR BLD AUTO: 59.9 % (ref 42.7–76)
NITRITE UR QL STRIP: NEGATIVE
NRBC BLD AUTO-RTO: 0 /100 WBC (ref 0–0.2)
PH UR STRIP.AUTO: 6 [PH] (ref 5–8)
PLATELET # BLD AUTO: 417 10*3/MM3 (ref 140–450)
PMV BLD AUTO: 11.3 FL (ref 6–12)
POTASSIUM BLD-SCNC: 4.9 MMOL/L (ref 3.5–5.2)
PROT SERPL-MCNC: 8.5 G/DL (ref 6–8.5)
PROT UR QL STRIP: NEGATIVE
RBC # BLD AUTO: 4.92 10*6/MM3 (ref 3.77–5.28)
RBC # UR: ABNORMAL /HPF
REF LAB TEST METHOD: ABNORMAL
SODIUM BLD-SCNC: 139 MMOL/L (ref 136–145)
SP GR UR STRIP: 1.01 (ref 1–1.03)
SQUAMOUS #/AREA URNS HPF: ABNORMAL /HPF
UROBILINOGEN UR QL STRIP: ABNORMAL
WBC NRBC COR # BLD: 8.88 10*3/MM3 (ref 3.4–10.8)
WBC UR QL AUTO: ABNORMAL /HPF

## 2020-04-08 PROCEDURE — 36415 COLL VENOUS BLD VENIPUNCTURE: CPT

## 2020-04-08 PROCEDURE — 81001 URINALYSIS AUTO W/SCOPE: CPT

## 2020-04-08 PROCEDURE — 87086 URINE CULTURE/COLONY COUNT: CPT

## 2020-04-08 PROCEDURE — 85025 COMPLETE CBC W/AUTO DIFF WBC: CPT

## 2020-04-08 PROCEDURE — 80053 COMPREHEN METABOLIC PANEL: CPT

## 2020-04-10 LAB — BACTERIA SPEC AEROBE CULT: NO GROWTH

## 2020-04-28 ENCOUNTER — HOSPITAL ENCOUNTER (OUTPATIENT)
Dept: CT IMAGING | Facility: HOSPITAL | Age: 50
Discharge: HOME | End: 2020-04-28
Admitting: OBSTETRICS & GYNECOLOGY

## 2020-04-28 DIAGNOSIS — R10.12 ABDOMINAL PAIN, LEFT UPPER QUADRANT: ICD-10-CM

## 2020-04-28 PROCEDURE — 74176 CT ABD & PELVIS W/O CONTRAST: CPT

## 2020-04-30 ENCOUNTER — APPOINTMENT (OUTPATIENT)
Dept: CT IMAGING | Facility: HOSPITAL | Age: 50
End: 2020-04-30

## 2020-06-09 ENCOUNTER — HOSPITAL ENCOUNTER (OUTPATIENT)
Dept: MAMMOGRAPHY | Facility: HOSPITAL | Age: 50
Discharge: HOME | End: 2020-06-09
Admitting: OBSTETRICS & GYNECOLOGY

## 2020-06-09 DIAGNOSIS — Z12.31 VISIT FOR SCREENING MAMMOGRAM: ICD-10-CM

## 2020-06-09 PROCEDURE — 77067 SCR MAMMO BI INCL CAD: CPT

## 2020-06-09 PROCEDURE — 77067 SCR MAMMO BI INCL CAD: CPT | Performed by: RADIOLOGY

## 2020-06-09 PROCEDURE — 77063 BREAST TOMOSYNTHESIS BI: CPT | Performed by: RADIOLOGY

## 2020-06-09 PROCEDURE — 77063 BREAST TOMOSYNTHESIS BI: CPT

## 2020-06-29 ENCOUNTER — TRANSCRIBE ORDERS (OUTPATIENT)
Dept: LAB | Facility: HOSPITAL | Age: 50
End: 2020-06-29

## 2020-06-29 ENCOUNTER — LAB (OUTPATIENT)
Dept: LAB | Facility: HOSPITAL | Age: 50
End: 2020-06-29

## 2020-06-29 DIAGNOSIS — N95.1 SYMPTOMATIC MENOPAUSAL OR FEMALE CLIMACTERIC STATES: Primary | ICD-10-CM

## 2020-06-29 DIAGNOSIS — N95.1 SYMPTOMATIC MENOPAUSAL OR FEMALE CLIMACTERIC STATES: ICD-10-CM

## 2020-06-29 PROCEDURE — 36415 COLL VENOUS BLD VENIPUNCTURE: CPT

## 2020-06-29 PROCEDURE — 82670 ASSAY OF TOTAL ESTRADIOL: CPT

## 2020-06-30 LAB — ESTRADIOL SERPL HS-MCNC: 30.8 PG/ML

## 2020-10-14 ENCOUNTER — LAB (OUTPATIENT)
Dept: LAB | Facility: HOSPITAL | Age: 50
End: 2020-10-14

## 2020-10-14 ENCOUNTER — TRANSCRIBE ORDERS (OUTPATIENT)
Dept: LAB | Facility: HOSPITAL | Age: 50
End: 2020-10-14

## 2020-10-14 DIAGNOSIS — R30.0 DYSURIA: Primary | ICD-10-CM

## 2020-10-14 LAB

## 2020-10-14 PROCEDURE — 81001 URINALYSIS AUTO W/SCOPE: CPT | Performed by: OBSTETRICS & GYNECOLOGY

## 2020-10-14 PROCEDURE — 87088 URINE BACTERIA CULTURE: CPT | Performed by: OBSTETRICS & GYNECOLOGY

## 2020-10-14 PROCEDURE — 87086 URINE CULTURE/COLONY COUNT: CPT | Performed by: OBSTETRICS & GYNECOLOGY

## 2020-10-14 PROCEDURE — 87186 SC STD MICRODIL/AGAR DIL: CPT | Performed by: OBSTETRICS & GYNECOLOGY

## 2020-10-16 LAB — BACTERIA SPEC AEROBE CULT: ABNORMAL

## 2021-05-26 ENCOUNTER — TRANSCRIBE ORDERS (OUTPATIENT)
Dept: ADMINISTRATIVE | Facility: HOSPITAL | Age: 51
End: 2021-05-26

## 2021-05-26 DIAGNOSIS — Z12.31 VISIT FOR SCREENING MAMMOGRAM: Primary | ICD-10-CM

## 2021-07-23 ENCOUNTER — HOSPITAL ENCOUNTER (OUTPATIENT)
Dept: MAMMOGRAPHY | Facility: HOSPITAL | Age: 51
Discharge: HOME | End: 2021-07-23
Admitting: OBSTETRICS & GYNECOLOGY

## 2021-07-23 DIAGNOSIS — Z12.31 VISIT FOR SCREENING MAMMOGRAM: ICD-10-CM

## 2021-07-23 PROCEDURE — 77067 SCR MAMMO BI INCL CAD: CPT | Performed by: RADIOLOGY

## 2021-07-23 PROCEDURE — 77063 BREAST TOMOSYNTHESIS BI: CPT | Performed by: RADIOLOGY

## 2021-07-23 PROCEDURE — 77063 BREAST TOMOSYNTHESIS BI: CPT

## 2021-07-23 PROCEDURE — 77067 SCR MAMMO BI INCL CAD: CPT

## 2021-10-08 ENCOUNTER — LAB (OUTPATIENT)
Dept: LAB | Facility: HOSPITAL | Age: 51
End: 2021-10-08

## 2021-10-08 ENCOUNTER — TRANSCRIBE ORDERS (OUTPATIENT)
Dept: LAB | Facility: HOSPITAL | Age: 51
End: 2021-10-08

## 2021-10-08 DIAGNOSIS — T47.4X5A LIPID GRANULOMA OF SKIN CAUSED BY MINERAL OIL: Primary | ICD-10-CM

## 2021-10-08 DIAGNOSIS — L92.9 LIPID GRANULOMA OF SKIN CAUSED BY MINERAL OIL: ICD-10-CM

## 2021-10-08 DIAGNOSIS — T47.4X5A LIPID GRANULOMA OF SKIN CAUSED BY MINERAL OIL: ICD-10-CM

## 2021-10-08 DIAGNOSIS — L92.9 LIPID GRANULOMA OF SKIN CAUSED BY MINERAL OIL: Primary | ICD-10-CM

## 2021-10-08 LAB
ANION GAP SERPL CALCULATED.3IONS-SCNC: 10.9 MMOL/L (ref 5–15)
BASOPHILS # BLD AUTO: 0.04 10*3/MM3 (ref 0–0.2)
BASOPHILS NFR BLD AUTO: 0.6 % (ref 0–1.5)
BUN SERPL-MCNC: 11 MG/DL (ref 6–20)
BUN/CREAT SERPL: 17.5 (ref 7–25)
CALCIUM SPEC-SCNC: 9.9 MG/DL (ref 8.6–10.5)
CHLORIDE SERPL-SCNC: 102 MMOL/L (ref 98–107)
CO2 SERPL-SCNC: 25.1 MMOL/L (ref 22–29)
CREAT SERPL-MCNC: 0.63 MG/DL (ref 0.57–1)
CRP SERPL-MCNC: <0.3 MG/DL (ref 0–0.5)
DEPRECATED RDW RBC AUTO: 40.3 FL (ref 37–54)
EOSINOPHIL # BLD AUTO: 0.09 10*3/MM3 (ref 0–0.4)
EOSINOPHIL NFR BLD AUTO: 1.4 % (ref 0.3–6.2)
ERYTHROCYTE [DISTWIDTH] IN BLOOD BY AUTOMATED COUNT: 12.4 % (ref 12.3–15.4)
ERYTHROCYTE [SEDIMENTATION RATE] IN BLOOD: 21 MM/HR (ref 0–30)
GFR SERPL CREATININE-BSD FRML MDRD: 99 ML/MIN/1.73
GLUCOSE SERPL-MCNC: 93 MG/DL (ref 65–99)
HCT VFR BLD AUTO: 42.9 % (ref 34–46.6)
HGB BLD-MCNC: 14.5 G/DL (ref 12–15.9)
IMM GRANULOCYTES # BLD AUTO: 0.02 10*3/MM3 (ref 0–0.05)
IMM GRANULOCYTES NFR BLD AUTO: 0.3 % (ref 0–0.5)
LYMPHOCYTES # BLD AUTO: 2.64 10*3/MM3 (ref 0.7–3.1)
LYMPHOCYTES NFR BLD AUTO: 39.9 % (ref 19.6–45.3)
MCH RBC QN AUTO: 30.3 PG (ref 26.6–33)
MCHC RBC AUTO-ENTMCNC: 33.8 G/DL (ref 31.5–35.7)
MCV RBC AUTO: 89.7 FL (ref 79–97)
MONOCYTES # BLD AUTO: 0.51 10*3/MM3 (ref 0.1–0.9)
MONOCYTES NFR BLD AUTO: 7.7 % (ref 5–12)
NEUTROPHILS NFR BLD AUTO: 3.32 10*3/MM3 (ref 1.7–7)
NEUTROPHILS NFR BLD AUTO: 50.1 % (ref 42.7–76)
NRBC BLD AUTO-RTO: 0 /100 WBC (ref 0–0.2)
PLATELET # BLD AUTO: 360 10*3/MM3 (ref 140–450)
PMV BLD AUTO: 11.2 FL (ref 6–12)
POTASSIUM SERPL-SCNC: 4.4 MMOL/L (ref 3.5–5.2)
RBC # BLD AUTO: 4.78 10*6/MM3 (ref 3.77–5.28)
SODIUM SERPL-SCNC: 138 MMOL/L (ref 136–145)
WBC # BLD AUTO: 6.62 10*3/MM3 (ref 3.4–10.8)

## 2021-10-08 PROCEDURE — 85652 RBC SED RATE AUTOMATED: CPT

## 2021-10-08 PROCEDURE — 36415 COLL VENOUS BLD VENIPUNCTURE: CPT | Performed by: ORTHOPAEDIC SURGERY

## 2021-10-08 PROCEDURE — 85025 COMPLETE CBC W/AUTO DIFF WBC: CPT

## 2021-10-08 PROCEDURE — 80048 BASIC METABOLIC PNL TOTAL CA: CPT | Performed by: ORTHOPAEDIC SURGERY

## 2021-10-08 PROCEDURE — 86140 C-REACTIVE PROTEIN: CPT | Performed by: ORTHOPAEDIC SURGERY

## 2021-10-14 ENCOUNTER — TRANSCRIBE ORDERS (OUTPATIENT)
Dept: ADMINISTRATIVE | Facility: HOSPITAL | Age: 51
End: 2021-10-14

## 2021-10-14 ENCOUNTER — LAB (OUTPATIENT)
Dept: LAB | Facility: HOSPITAL | Age: 51
End: 2021-10-14

## 2021-10-14 ENCOUNTER — TRANSCRIBE ORDERS (OUTPATIENT)
Dept: LAB | Facility: HOSPITAL | Age: 51
End: 2021-10-14

## 2021-10-14 DIAGNOSIS — T84.610A INFLAMMATORY REACTION DUE TO INTERNAL FIXATION DEVICE OF RIGHT HUMERUS, INITIAL ENCOUNTER (HCC): Primary | ICD-10-CM

## 2021-10-14 DIAGNOSIS — L03.113 CELLULITIS OF RIGHT HAND EXCLUDING FINGERS AND THUMB: ICD-10-CM

## 2021-10-14 DIAGNOSIS — T84.610A INFLAMMATORY REACTION DUE TO INTERNAL FIXATION DEVICE OF RIGHT HUMERUS, INITIAL ENCOUNTER (HCC): ICD-10-CM

## 2021-10-14 LAB
ALBUMIN SERPL-MCNC: 4.7 G/DL (ref 3.5–5.2)
ALBUMIN/GLOB SERPL: 1.5 G/DL
ALP SERPL-CCNC: 92 U/L (ref 39–117)
ALT SERPL W P-5'-P-CCNC: 43 U/L (ref 1–33)
ANION GAP SERPL CALCULATED.3IONS-SCNC: 13 MMOL/L (ref 5–15)
AST SERPL-CCNC: 29 U/L (ref 1–32)
BASOPHILS # BLD AUTO: 0.03 10*3/MM3 (ref 0–0.2)
BASOPHILS NFR BLD AUTO: 0.4 % (ref 0–1.5)
BILIRUB SERPL-MCNC: 0.5 MG/DL (ref 0–1.2)
BUN SERPL-MCNC: 14 MG/DL (ref 6–20)
BUN/CREAT SERPL: 20 (ref 7–25)
CALCIUM SPEC-SCNC: 9.6 MG/DL (ref 8.6–10.5)
CHLORIDE SERPL-SCNC: 102 MMOL/L (ref 98–107)
CK SERPL-CCNC: 561 U/L (ref 20–180)
CO2 SERPL-SCNC: 23 MMOL/L (ref 22–29)
CREAT SERPL-MCNC: 0.7 MG/DL (ref 0.57–1)
CRP SERPL-MCNC: <0.3 MG/DL (ref 0–0.5)
DEPRECATED RDW RBC AUTO: 41.3 FL (ref 37–54)
EOSINOPHIL # BLD AUTO: 0.1 10*3/MM3 (ref 0–0.4)
EOSINOPHIL NFR BLD AUTO: 1.3 % (ref 0.3–6.2)
ERYTHROCYTE [DISTWIDTH] IN BLOOD BY AUTOMATED COUNT: 12.3 % (ref 12.3–15.4)
ERYTHROCYTE [SEDIMENTATION RATE] IN BLOOD: 14 MM/HR (ref 0–30)
GFR SERPL CREATININE-BSD FRML MDRD: 88 ML/MIN/1.73
GLOBULIN UR ELPH-MCNC: 3.1 GM/DL
GLUCOSE SERPL-MCNC: 112 MG/DL (ref 65–99)
HCT VFR BLD AUTO: 42.7 % (ref 34–46.6)
HGB BLD-MCNC: 14.4 G/DL (ref 12–15.9)
IMM GRANULOCYTES # BLD AUTO: 0.02 10*3/MM3 (ref 0–0.05)
IMM GRANULOCYTES NFR BLD AUTO: 0.3 % (ref 0–0.5)
LYMPHOCYTES # BLD AUTO: 2.82 10*3/MM3 (ref 0.7–3.1)
LYMPHOCYTES NFR BLD AUTO: 37.7 % (ref 19.6–45.3)
MCH RBC QN AUTO: 30.9 PG (ref 26.6–33)
MCHC RBC AUTO-ENTMCNC: 33.7 G/DL (ref 31.5–35.7)
MCV RBC AUTO: 91.6 FL (ref 79–97)
MONOCYTES # BLD AUTO: 0.54 10*3/MM3 (ref 0.1–0.9)
MONOCYTES NFR BLD AUTO: 7.2 % (ref 5–12)
NEUTROPHILS NFR BLD AUTO: 3.98 10*3/MM3 (ref 1.7–7)
NEUTROPHILS NFR BLD AUTO: 53.1 % (ref 42.7–76)
NRBC BLD AUTO-RTO: 0 /100 WBC (ref 0–0.2)
PLATELET # BLD AUTO: 341 10*3/MM3 (ref 140–450)
PMV BLD AUTO: 10.4 FL (ref 6–12)
POTASSIUM SERPL-SCNC: 4.1 MMOL/L (ref 3.5–5.2)
PROT SERPL-MCNC: 7.8 G/DL (ref 6–8.5)
RBC # BLD AUTO: 4.66 10*6/MM3 (ref 3.77–5.28)
SODIUM SERPL-SCNC: 138 MMOL/L (ref 136–145)
WBC # BLD AUTO: 7.49 10*3/MM3 (ref 3.4–10.8)

## 2021-10-14 PROCEDURE — 85025 COMPLETE CBC W/AUTO DIFF WBC: CPT

## 2021-10-14 PROCEDURE — 80053 COMPREHEN METABOLIC PANEL: CPT

## 2021-10-14 PROCEDURE — 86140 C-REACTIVE PROTEIN: CPT

## 2021-10-14 PROCEDURE — 82550 ASSAY OF CK (CPK): CPT

## 2021-10-14 PROCEDURE — 85652 RBC SED RATE AUTOMATED: CPT

## 2021-10-14 PROCEDURE — 36415 COLL VENOUS BLD VENIPUNCTURE: CPT

## 2021-10-18 PROCEDURE — U0004 COV-19 TEST NON-CDC HGH THRU: HCPCS | Performed by: NURSE PRACTITIONER

## 2021-10-20 ENCOUNTER — TELEPHONE (OUTPATIENT)
Dept: URGENT CARE | Facility: CLINIC | Age: 51
End: 2021-10-20

## 2021-10-20 NOTE — TELEPHONE ENCOUNTER
----- Message from Evin Beckham MD sent at 10/20/2021  8:17 AM EDT -----  Covid test is negative.      LMTCB

## 2021-10-21 ENCOUNTER — TRANSCRIBE ORDERS (OUTPATIENT)
Dept: LAB | Facility: HOSPITAL | Age: 51
End: 2021-10-21

## 2021-10-21 ENCOUNTER — LAB (OUTPATIENT)
Dept: LAB | Facility: HOSPITAL | Age: 51
End: 2021-10-21

## 2021-10-21 ENCOUNTER — HOSPITAL ENCOUNTER (OUTPATIENT)
Dept: INFUSION THERAPY | Facility: HOSPITAL | Age: 51
Discharge: HOME | End: 2021-10-21

## 2021-10-21 VITALS
TEMPERATURE: 97.7 F | SYSTOLIC BLOOD PRESSURE: 168 MMHG | OXYGEN SATURATION: 98 % | RESPIRATION RATE: 16 BRPM | DIASTOLIC BLOOD PRESSURE: 108 MMHG | HEART RATE: 85 BPM

## 2021-10-21 DIAGNOSIS — T84.610A INFLAMMATORY REACTION DUE TO INTERNAL FIXATION DEVICE OF RIGHT HUMERUS, INITIAL ENCOUNTER (HCC): ICD-10-CM

## 2021-10-21 DIAGNOSIS — L03.113 CELLULITIS OF RIGHT HAND EXCLUDING FINGERS AND THUMB: ICD-10-CM

## 2021-10-21 DIAGNOSIS — T84.610S: Primary | ICD-10-CM

## 2021-10-21 DIAGNOSIS — T84.610S: ICD-10-CM

## 2021-10-21 LAB — CK SERPL-CCNC: 605 U/L (ref 20–180)

## 2021-10-21 PROCEDURE — C1894 INTRO/SHEATH, NON-LASER: HCPCS

## 2021-10-21 PROCEDURE — 36415 COLL VENOUS BLD VENIPUNCTURE: CPT

## 2021-10-21 PROCEDURE — 82550 ASSAY OF CK (CPK): CPT

## 2021-10-21 PROCEDURE — C1751 CATH, INF, PER/CENT/MIDLINE: HCPCS

## 2021-10-21 RX ORDER — SODIUM CHLORIDE 0.9 % (FLUSH) 0.9 %
10 SYRINGE (ML) INJECTION AS NEEDED
Status: DISCONTINUED | OUTPATIENT
Start: 2021-10-21 | End: 2021-10-23 | Stop reason: HOSPADM

## 2021-10-21 RX ORDER — SODIUM CHLORIDE 0.9 % (FLUSH) 0.9 %
10 SYRINGE (ML) INJECTION EVERY 12 HOURS SCHEDULED
Status: DISCONTINUED | OUTPATIENT
Start: 2021-10-21 | End: 2021-10-23 | Stop reason: HOSPADM

## 2021-10-21 NOTE — PROGRESS NOTES
Pt discharged from ir dept s/p picc line placement. Pt tolerated procedure without complications. Education provided by [facility] nurses. Pt left unit ambulatory with her daughter to head back to [facility] for infusion.

## 2021-10-24 ENCOUNTER — LAB (OUTPATIENT)
Dept: LAB | Facility: HOSPITAL | Age: 51
End: 2021-10-24

## 2021-10-24 ENCOUNTER — TRANSCRIBE ORDERS (OUTPATIENT)
Dept: LAB | Facility: HOSPITAL | Age: 51
End: 2021-10-24

## 2021-10-24 DIAGNOSIS — T84.610A INFLAMMATORY REACTION DUE TO INTERNAL FIXATION DEVICE OF RIGHT HUMERUS, INITIAL ENCOUNTER (HCC): ICD-10-CM

## 2021-10-24 DIAGNOSIS — T84.610A INFLAMMATORY REACTION DUE TO INTERNAL FIXATION DEVICE OF RIGHT HUMERUS, INITIAL ENCOUNTER (HCC): Primary | ICD-10-CM

## 2021-10-24 DIAGNOSIS — L03.113 CELLULITIS OF ARM, RIGHT: ICD-10-CM

## 2021-10-24 LAB
ALBUMIN SERPL-MCNC: 4.9 G/DL (ref 3.5–5.2)
ALBUMIN/GLOB SERPL: 1.4 G/DL
ALP SERPL-CCNC: 89 U/L (ref 39–117)
ALT SERPL W P-5'-P-CCNC: 27 U/L (ref 1–33)
ANION GAP SERPL CALCULATED.3IONS-SCNC: 12 MMOL/L (ref 5–15)
AST SERPL-CCNC: 26 U/L (ref 1–32)
BASOPHILS # BLD AUTO: 0.04 10*3/MM3 (ref 0–0.2)
BASOPHILS NFR BLD AUTO: 0.5 % (ref 0–1.5)
BILIRUB SERPL-MCNC: 0.2 MG/DL (ref 0–1.2)
BUN SERPL-MCNC: 10 MG/DL (ref 6–20)
BUN/CREAT SERPL: 15.9 (ref 7–25)
CALCIUM SPEC-SCNC: 9.8 MG/DL (ref 8.6–10.5)
CHLORIDE SERPL-SCNC: 102 MMOL/L (ref 98–107)
CO2 SERPL-SCNC: 24 MMOL/L (ref 22–29)
CREAT SERPL-MCNC: 0.63 MG/DL (ref 0.57–1)
CRP SERPL-MCNC: <0.3 MG/DL (ref 0–0.5)
DEPRECATED RDW RBC AUTO: 40.2 FL (ref 37–54)
EOSINOPHIL # BLD AUTO: 0.13 10*3/MM3 (ref 0–0.4)
EOSINOPHIL NFR BLD AUTO: 1.5 % (ref 0.3–6.2)
ERYTHROCYTE [DISTWIDTH] IN BLOOD BY AUTOMATED COUNT: 12.1 % (ref 12.3–15.4)
ERYTHROCYTE [SEDIMENTATION RATE] IN BLOOD: 21 MM/HR (ref 0–30)
GFR SERPL CREATININE-BSD FRML MDRD: 99 ML/MIN/1.73
GLOBULIN UR ELPH-MCNC: 3.4 GM/DL
GLUCOSE SERPL-MCNC: 108 MG/DL (ref 65–99)
HCT VFR BLD AUTO: 43.9 % (ref 34–46.6)
HGB BLD-MCNC: 14.8 G/DL (ref 12–15.9)
IMM GRANULOCYTES # BLD AUTO: 0.02 10*3/MM3 (ref 0–0.05)
IMM GRANULOCYTES NFR BLD AUTO: 0.2 % (ref 0–0.5)
LYMPHOCYTES # BLD AUTO: 3.46 10*3/MM3 (ref 0.7–3.1)
LYMPHOCYTES NFR BLD AUTO: 39.4 % (ref 19.6–45.3)
MCH RBC QN AUTO: 30.6 PG (ref 26.6–33)
MCHC RBC AUTO-ENTMCNC: 33.7 G/DL (ref 31.5–35.7)
MCV RBC AUTO: 90.7 FL (ref 79–97)
MONOCYTES # BLD AUTO: 0.61 10*3/MM3 (ref 0.1–0.9)
MONOCYTES NFR BLD AUTO: 6.9 % (ref 5–12)
NEUTROPHILS NFR BLD AUTO: 4.53 10*3/MM3 (ref 1.7–7)
NEUTROPHILS NFR BLD AUTO: 51.5 % (ref 42.7–76)
NRBC BLD AUTO-RTO: 0 /100 WBC (ref 0–0.2)
PLATELET # BLD AUTO: 377 10*3/MM3 (ref 140–450)
PMV BLD AUTO: 10.2 FL (ref 6–12)
POTASSIUM SERPL-SCNC: 3.9 MMOL/L (ref 3.5–5.2)
PROT SERPL-MCNC: 8.3 G/DL (ref 6–8.5)
RBC # BLD AUTO: 4.84 10*6/MM3 (ref 3.77–5.28)
SODIUM SERPL-SCNC: 138 MMOL/L (ref 136–145)
VANCOMYCIN TROUGH SERPL-MCNC: 4.5 MCG/ML (ref 5–20)
WBC # BLD AUTO: 8.79 10*3/MM3 (ref 3.4–10.8)

## 2021-10-24 PROCEDURE — 36415 COLL VENOUS BLD VENIPUNCTURE: CPT

## 2021-10-24 PROCEDURE — 80053 COMPREHEN METABOLIC PANEL: CPT

## 2021-10-24 PROCEDURE — 85025 COMPLETE CBC W/AUTO DIFF WBC: CPT

## 2021-10-24 PROCEDURE — 86140 C-REACTIVE PROTEIN: CPT

## 2021-10-24 PROCEDURE — 85652 RBC SED RATE AUTOMATED: CPT

## 2021-10-24 PROCEDURE — 80202 ASSAY OF VANCOMYCIN: CPT

## 2021-11-02 ENCOUNTER — TRANSCRIBE ORDERS (OUTPATIENT)
Dept: LAB | Facility: HOSPITAL | Age: 51
End: 2021-11-02

## 2021-11-02 ENCOUNTER — LAB (OUTPATIENT)
Dept: LAB | Facility: HOSPITAL | Age: 51
End: 2021-11-02

## 2021-11-02 DIAGNOSIS — L03.113 CELLULITIS OF RIGHT HAND EXCLUDING FINGERS AND THUMB: Primary | ICD-10-CM

## 2021-11-02 DIAGNOSIS — L03.113 CELLULITIS OF RIGHT HAND EXCLUDING FINGERS AND THUMB: ICD-10-CM

## 2021-11-02 LAB
ALBUMIN SERPL-MCNC: 4.7 G/DL (ref 3.5–5.2)
ALBUMIN/GLOB SERPL: 1.6 G/DL
ALP SERPL-CCNC: 102 U/L (ref 39–117)
ALT SERPL W P-5'-P-CCNC: 77 U/L (ref 1–33)
ANION GAP SERPL CALCULATED.3IONS-SCNC: 10 MMOL/L (ref 5–15)
AST SERPL-CCNC: 50 U/L (ref 1–32)
BASOPHILS # BLD AUTO: 0.05 10*3/MM3 (ref 0–0.2)
BASOPHILS NFR BLD AUTO: 0.8 % (ref 0–1.5)
BILIRUB SERPL-MCNC: 0.3 MG/DL (ref 0–1.2)
BUN SERPL-MCNC: 11 MG/DL (ref 6–20)
BUN/CREAT SERPL: 19.3 (ref 7–25)
CALCIUM SPEC-SCNC: 9.7 MG/DL (ref 8.6–10.5)
CHLORIDE SERPL-SCNC: 105 MMOL/L (ref 98–107)
CO2 SERPL-SCNC: 24 MMOL/L (ref 22–29)
CREAT SERPL-MCNC: 0.57 MG/DL (ref 0.57–1)
CRP SERPL-MCNC: <0.3 MG/DL (ref 0–0.5)
DEPRECATED RDW RBC AUTO: 41 FL (ref 37–54)
EOSINOPHIL # BLD AUTO: 0.19 10*3/MM3 (ref 0–0.4)
EOSINOPHIL NFR BLD AUTO: 2.9 % (ref 0.3–6.2)
ERYTHROCYTE [DISTWIDTH] IN BLOOD BY AUTOMATED COUNT: 12.4 % (ref 12.3–15.4)
ERYTHROCYTE [SEDIMENTATION RATE] IN BLOOD: 9 MM/HR (ref 0–30)
GFR SERPL CREATININE-BSD FRML MDRD: 111 ML/MIN/1.73
GLOBULIN UR ELPH-MCNC: 2.9 GM/DL
GLUCOSE SERPL-MCNC: 98 MG/DL (ref 65–99)
HCT VFR BLD AUTO: 41.4 % (ref 34–46.6)
HGB BLD-MCNC: 14.1 G/DL (ref 12–15.9)
IMM GRANULOCYTES # BLD AUTO: 0.01 10*3/MM3 (ref 0–0.05)
IMM GRANULOCYTES NFR BLD AUTO: 0.2 % (ref 0–0.5)
LYMPHOCYTES # BLD AUTO: 3.04 10*3/MM3 (ref 0.7–3.1)
LYMPHOCYTES NFR BLD AUTO: 46.6 % (ref 19.6–45.3)
MCH RBC QN AUTO: 30.6 PG (ref 26.6–33)
MCHC RBC AUTO-ENTMCNC: 34.1 G/DL (ref 31.5–35.7)
MCV RBC AUTO: 89.8 FL (ref 79–97)
MONOCYTES # BLD AUTO: 0.41 10*3/MM3 (ref 0.1–0.9)
MONOCYTES NFR BLD AUTO: 6.3 % (ref 5–12)
NEUTROPHILS NFR BLD AUTO: 2.83 10*3/MM3 (ref 1.7–7)
NEUTROPHILS NFR BLD AUTO: 43.2 % (ref 42.7–76)
NRBC BLD AUTO-RTO: 0 /100 WBC (ref 0–0.2)
PLATELET # BLD AUTO: 335 10*3/MM3 (ref 140–450)
PMV BLD AUTO: 10.5 FL (ref 6–12)
POTASSIUM SERPL-SCNC: 4.1 MMOL/L (ref 3.5–5.2)
PROT SERPL-MCNC: 7.6 G/DL (ref 6–8.5)
RBC # BLD AUTO: 4.61 10*6/MM3 (ref 3.77–5.28)
SODIUM SERPL-SCNC: 139 MMOL/L (ref 136–145)
WBC # BLD AUTO: 6.53 10*3/MM3 (ref 3.4–10.8)

## 2021-11-02 PROCEDURE — 85652 RBC SED RATE AUTOMATED: CPT | Performed by: INTERNAL MEDICINE

## 2021-11-02 PROCEDURE — 36415 COLL VENOUS BLD VENIPUNCTURE: CPT

## 2021-11-02 PROCEDURE — 86140 C-REACTIVE PROTEIN: CPT

## 2021-11-02 PROCEDURE — 85025 COMPLETE CBC W/AUTO DIFF WBC: CPT

## 2021-11-02 PROCEDURE — 80053 COMPREHEN METABOLIC PANEL: CPT | Performed by: INTERNAL MEDICINE

## 2021-11-09 ENCOUNTER — LAB (OUTPATIENT)
Dept: LAB | Facility: HOSPITAL | Age: 51
End: 2021-11-09

## 2021-11-09 ENCOUNTER — TRANSCRIBE ORDERS (OUTPATIENT)
Dept: LAB | Facility: HOSPITAL | Age: 51
End: 2021-11-09

## 2021-11-09 DIAGNOSIS — T84.610A INFLAMMATORY REACTION DUE TO INTERNAL FIXATION DEVICE OF RIGHT HUMERUS, INITIAL ENCOUNTER (HCC): Primary | ICD-10-CM

## 2021-11-09 DIAGNOSIS — T84.610A INFLAMMATORY REACTION DUE TO INTERNAL FIXATION DEVICE OF RIGHT HUMERUS, INITIAL ENCOUNTER (HCC): ICD-10-CM

## 2021-11-09 DIAGNOSIS — L03.115 CELLULITIS OF RIGHT FOOT: ICD-10-CM

## 2021-11-09 LAB
ALBUMIN SERPL-MCNC: 4.6 G/DL (ref 3.5–5.2)
ALBUMIN/GLOB SERPL: 1.6 G/DL
ALP SERPL-CCNC: 101 U/L (ref 39–117)
ALT SERPL W P-5'-P-CCNC: 50 U/L (ref 1–33)
ANION GAP SERPL CALCULATED.3IONS-SCNC: 9 MMOL/L (ref 5–15)
AST SERPL-CCNC: 57 U/L (ref 1–32)
BASOPHILS # BLD AUTO: 0.05 10*3/MM3 (ref 0–0.2)
BASOPHILS NFR BLD AUTO: 0.9 % (ref 0–1.5)
BILIRUB SERPL-MCNC: 0.3 MG/DL (ref 0–1.2)
BUN SERPL-MCNC: 12 MG/DL (ref 6–20)
BUN/CREAT SERPL: 21.4 (ref 7–25)
CALCIUM SPEC-SCNC: 9.5 MG/DL (ref 8.6–10.5)
CHLORIDE SERPL-SCNC: 102 MMOL/L (ref 98–107)
CO2 SERPL-SCNC: 26 MMOL/L (ref 22–29)
CREAT SERPL-MCNC: 0.56 MG/DL (ref 0.57–1)
CRP SERPL-MCNC: <0.3 MG/DL (ref 0–0.5)
DEPRECATED RDW RBC AUTO: 41 FL (ref 37–54)
EOSINOPHIL # BLD AUTO: 0.17 10*3/MM3 (ref 0–0.4)
EOSINOPHIL NFR BLD AUTO: 2.9 % (ref 0.3–6.2)
ERYTHROCYTE [DISTWIDTH] IN BLOOD BY AUTOMATED COUNT: 12.4 % (ref 12.3–15.4)
ERYTHROCYTE [SEDIMENTATION RATE] IN BLOOD: 7 MM/HR (ref 0–30)
GFR SERPL CREATININE-BSD FRML MDRD: 113 ML/MIN/1.73
GLOBULIN UR ELPH-MCNC: 2.8 GM/DL
GLUCOSE SERPL-MCNC: 105 MG/DL (ref 65–99)
HCT VFR BLD AUTO: 40.3 % (ref 34–46.6)
HGB BLD-MCNC: 13.6 G/DL (ref 12–15.9)
IMM GRANULOCYTES # BLD AUTO: 0.01 10*3/MM3 (ref 0–0.05)
IMM GRANULOCYTES NFR BLD AUTO: 0.2 % (ref 0–0.5)
LYMPHOCYTES # BLD AUTO: 2.75 10*3/MM3 (ref 0.7–3.1)
LYMPHOCYTES NFR BLD AUTO: 47.7 % (ref 19.6–45.3)
MCH RBC QN AUTO: 30.5 PG (ref 26.6–33)
MCHC RBC AUTO-ENTMCNC: 33.7 G/DL (ref 31.5–35.7)
MCV RBC AUTO: 90.4 FL (ref 79–97)
MONOCYTES # BLD AUTO: 0.42 10*3/MM3 (ref 0.1–0.9)
MONOCYTES NFR BLD AUTO: 7.3 % (ref 5–12)
NEUTROPHILS NFR BLD AUTO: 2.37 10*3/MM3 (ref 1.7–7)
NEUTROPHILS NFR BLD AUTO: 41 % (ref 42.7–76)
NRBC BLD AUTO-RTO: 0 /100 WBC (ref 0–0.2)
PLATELET # BLD AUTO: 295 10*3/MM3 (ref 140–450)
PMV BLD AUTO: 10.4 FL (ref 6–12)
POTASSIUM SERPL-SCNC: 4.3 MMOL/L (ref 3.5–5.2)
PROT SERPL-MCNC: 7.4 G/DL (ref 6–8.5)
RBC # BLD AUTO: 4.46 10*6/MM3 (ref 3.77–5.28)
SODIUM SERPL-SCNC: 137 MMOL/L (ref 136–145)
WBC # BLD AUTO: 5.77 10*3/MM3 (ref 3.4–10.8)

## 2021-11-09 PROCEDURE — 36415 COLL VENOUS BLD VENIPUNCTURE: CPT

## 2021-11-09 PROCEDURE — 85025 COMPLETE CBC W/AUTO DIFF WBC: CPT

## 2021-11-09 PROCEDURE — 85652 RBC SED RATE AUTOMATED: CPT

## 2021-11-09 PROCEDURE — 86140 C-REACTIVE PROTEIN: CPT

## 2021-11-09 PROCEDURE — 80053 COMPREHEN METABOLIC PANEL: CPT

## 2021-11-16 ENCOUNTER — TRANSCRIBE ORDERS (OUTPATIENT)
Dept: LAB | Facility: HOSPITAL | Age: 51
End: 2021-11-16

## 2021-11-16 ENCOUNTER — LAB (OUTPATIENT)
Dept: LAB | Facility: HOSPITAL | Age: 51
End: 2021-11-16

## 2021-11-16 DIAGNOSIS — L03.113 CELLULITIS OF RIGHT HAND EXCLUDING FINGERS AND THUMB: ICD-10-CM

## 2021-11-16 DIAGNOSIS — T84.610A INFLAMMATORY REACTION DUE TO INTERNAL FIXATION DEVICE OF RIGHT HUMERUS, INITIAL ENCOUNTER (HCC): Primary | ICD-10-CM

## 2021-11-16 DIAGNOSIS — T84.610A INFLAMMATORY REACTION DUE TO INTERNAL FIXATION DEVICE OF RIGHT HUMERUS, INITIAL ENCOUNTER (HCC): ICD-10-CM

## 2021-11-16 LAB
ALBUMIN SERPL-MCNC: 4.6 G/DL (ref 3.5–5.2)
ALBUMIN/GLOB SERPL: 1.6 G/DL
ALP SERPL-CCNC: 105 U/L (ref 39–117)
ALT SERPL W P-5'-P-CCNC: 83 U/L (ref 1–33)
ANION GAP SERPL CALCULATED.3IONS-SCNC: 10 MMOL/L (ref 5–15)
AST SERPL-CCNC: 53 U/L (ref 1–32)
BASOPHILS # BLD AUTO: 0.06 10*3/MM3 (ref 0–0.2)
BASOPHILS NFR BLD AUTO: 1.2 % (ref 0–1.5)
BILIRUB SERPL-MCNC: 0.3 MG/DL (ref 0–1.2)
BUN SERPL-MCNC: 10 MG/DL (ref 6–20)
BUN/CREAT SERPL: 16.7 (ref 7–25)
CALCIUM SPEC-SCNC: 9.5 MG/DL (ref 8.6–10.5)
CHLORIDE SERPL-SCNC: 105 MMOL/L (ref 98–107)
CO2 SERPL-SCNC: 26 MMOL/L (ref 22–29)
CREAT SERPL-MCNC: 0.6 MG/DL (ref 0.57–1)
CRP SERPL-MCNC: <0.3 MG/DL (ref 0–0.5)
DEPRECATED RDW RBC AUTO: 41.6 FL (ref 37–54)
EOSINOPHIL # BLD AUTO: 0.19 10*3/MM3 (ref 0–0.4)
EOSINOPHIL NFR BLD AUTO: 3.9 % (ref 0.3–6.2)
ERYTHROCYTE [DISTWIDTH] IN BLOOD BY AUTOMATED COUNT: 12.3 % (ref 12.3–15.4)
ERYTHROCYTE [SEDIMENTATION RATE] IN BLOOD: 6 MM/HR (ref 0–30)
GFR SERPL CREATININE-BSD FRML MDRD: 105 ML/MIN/1.73
GLOBULIN UR ELPH-MCNC: 2.8 GM/DL
GLUCOSE SERPL-MCNC: 103 MG/DL (ref 65–99)
HCT VFR BLD AUTO: 41.1 % (ref 34–46.6)
HGB BLD-MCNC: 13.6 G/DL (ref 12–15.9)
IMM GRANULOCYTES # BLD AUTO: 0.01 10*3/MM3 (ref 0–0.05)
IMM GRANULOCYTES NFR BLD AUTO: 0.2 % (ref 0–0.5)
LYMPHOCYTES # BLD AUTO: 2.18 10*3/MM3 (ref 0.7–3.1)
LYMPHOCYTES NFR BLD AUTO: 45.1 % (ref 19.6–45.3)
MCH RBC QN AUTO: 30.6 PG (ref 26.6–33)
MCHC RBC AUTO-ENTMCNC: 33.1 G/DL (ref 31.5–35.7)
MCV RBC AUTO: 92.4 FL (ref 79–97)
MONOCYTES # BLD AUTO: 0.41 10*3/MM3 (ref 0.1–0.9)
MONOCYTES NFR BLD AUTO: 8.5 % (ref 5–12)
NEUTROPHILS NFR BLD AUTO: 1.98 10*3/MM3 (ref 1.7–7)
NEUTROPHILS NFR BLD AUTO: 41.1 % (ref 42.7–76)
NRBC BLD AUTO-RTO: 0 /100 WBC (ref 0–0.2)
PLATELET # BLD AUTO: 240 10*3/MM3 (ref 140–450)
PMV BLD AUTO: 11.2 FL (ref 6–12)
POTASSIUM SERPL-SCNC: 4.7 MMOL/L (ref 3.5–5.2)
PROT SERPL-MCNC: 7.4 G/DL (ref 6–8.5)
RBC # BLD AUTO: 4.45 10*6/MM3 (ref 3.77–5.28)
SODIUM SERPL-SCNC: 141 MMOL/L (ref 136–145)
WBC # BLD AUTO: 4.83 10*3/MM3 (ref 3.4–10.8)

## 2021-11-16 PROCEDURE — 80053 COMPREHEN METABOLIC PANEL: CPT

## 2021-11-16 PROCEDURE — 86140 C-REACTIVE PROTEIN: CPT

## 2021-11-16 PROCEDURE — 85652 RBC SED RATE AUTOMATED: CPT

## 2021-11-16 PROCEDURE — 85025 COMPLETE CBC W/AUTO DIFF WBC: CPT

## 2021-11-16 PROCEDURE — 36415 COLL VENOUS BLD VENIPUNCTURE: CPT

## 2021-11-23 ENCOUNTER — TRANSCRIBE ORDERS (OUTPATIENT)
Dept: LAB | Facility: HOSPITAL | Age: 51
End: 2021-11-23

## 2021-11-23 ENCOUNTER — LAB (OUTPATIENT)
Dept: LAB | Facility: HOSPITAL | Age: 51
End: 2021-11-23

## 2021-11-23 DIAGNOSIS — T84.610A INFLAMMATORY REACTION DUE TO INTERNAL FIXATION DEVICE OF RIGHT HUMERUS, INITIAL ENCOUNTER (HCC): Primary | ICD-10-CM

## 2021-11-23 DIAGNOSIS — L03.113 CELLULITIS OF RIGHT HAND EXCLUDING FINGERS AND THUMB: ICD-10-CM

## 2021-11-23 DIAGNOSIS — T84.610A INFLAMMATORY REACTION DUE TO INTERNAL FIXATION DEVICE OF RIGHT HUMERUS, INITIAL ENCOUNTER (HCC): ICD-10-CM

## 2021-11-23 LAB
ALBUMIN SERPL-MCNC: 4.8 G/DL (ref 3.5–5.2)
ALBUMIN/GLOB SERPL: 1.6 G/DL
ALP SERPL-CCNC: 109 U/L (ref 39–117)
ALT SERPL W P-5'-P-CCNC: 74 U/L (ref 1–33)
ANION GAP SERPL CALCULATED.3IONS-SCNC: 10 MMOL/L (ref 5–15)
AST SERPL-CCNC: 74 U/L (ref 1–32)
BASOPHILS # BLD AUTO: 0.03 10*3/MM3 (ref 0–0.2)
BASOPHILS NFR BLD AUTO: 0.7 % (ref 0–1.5)
BILIRUB SERPL-MCNC: 0.2 MG/DL (ref 0–1.2)
BUN SERPL-MCNC: 16 MG/DL (ref 6–20)
BUN/CREAT SERPL: 26.7 (ref 7–25)
CALCIUM SPEC-SCNC: 9.6 MG/DL (ref 8.6–10.5)
CHLORIDE SERPL-SCNC: 103 MMOL/L (ref 98–107)
CO2 SERPL-SCNC: 25 MMOL/L (ref 22–29)
CREAT SERPL-MCNC: 0.6 MG/DL (ref 0.57–1)
CRP SERPL-MCNC: <0.3 MG/DL (ref 0–0.5)
DEPRECATED RDW RBC AUTO: 40.6 FL (ref 37–54)
EOSINOPHIL # BLD AUTO: 0.16 10*3/MM3 (ref 0–0.4)
EOSINOPHIL NFR BLD AUTO: 3.7 % (ref 0.3–6.2)
ERYTHROCYTE [DISTWIDTH] IN BLOOD BY AUTOMATED COUNT: 12.3 % (ref 12.3–15.4)
ERYTHROCYTE [SEDIMENTATION RATE] IN BLOOD: 8 MM/HR (ref 0–30)
GFR SERPL CREATININE-BSD FRML MDRD: 105 ML/MIN/1.73
GLOBULIN UR ELPH-MCNC: 3 GM/DL
GLUCOSE SERPL-MCNC: 106 MG/DL (ref 65–99)
HCT VFR BLD AUTO: 42 % (ref 34–46.6)
HGB BLD-MCNC: 14.1 G/DL (ref 12–15.9)
IMM GRANULOCYTES # BLD AUTO: 0.01 10*3/MM3 (ref 0–0.05)
IMM GRANULOCYTES NFR BLD AUTO: 0.2 % (ref 0–0.5)
LYMPHOCYTES # BLD AUTO: 1.96 10*3/MM3 (ref 0.7–3.1)
LYMPHOCYTES NFR BLD AUTO: 45.4 % (ref 19.6–45.3)
MCH RBC QN AUTO: 30.5 PG (ref 26.6–33)
MCHC RBC AUTO-ENTMCNC: 33.6 G/DL (ref 31.5–35.7)
MCV RBC AUTO: 90.9 FL (ref 79–97)
MONOCYTES # BLD AUTO: 0.43 10*3/MM3 (ref 0.1–0.9)
MONOCYTES NFR BLD AUTO: 10 % (ref 5–12)
NEUTROPHILS NFR BLD AUTO: 1.73 10*3/MM3 (ref 1.7–7)
NEUTROPHILS NFR BLD AUTO: 40 % (ref 42.7–76)
NRBC BLD AUTO-RTO: 0 /100 WBC (ref 0–0.2)
PLATELET # BLD AUTO: 268 10*3/MM3 (ref 140–450)
PMV BLD AUTO: 10.1 FL (ref 6–12)
POTASSIUM SERPL-SCNC: 5.1 MMOL/L (ref 3.5–5.2)
PROT SERPL-MCNC: 7.8 G/DL (ref 6–8.5)
RBC # BLD AUTO: 4.62 10*6/MM3 (ref 3.77–5.28)
SODIUM SERPL-SCNC: 138 MMOL/L (ref 136–145)
WBC NRBC COR # BLD: 4.32 10*3/MM3 (ref 3.4–10.8)

## 2021-11-23 PROCEDURE — 80053 COMPREHEN METABOLIC PANEL: CPT

## 2021-11-23 PROCEDURE — 36415 COLL VENOUS BLD VENIPUNCTURE: CPT

## 2021-11-23 PROCEDURE — 86140 C-REACTIVE PROTEIN: CPT

## 2021-11-23 PROCEDURE — 85025 COMPLETE CBC W/AUTO DIFF WBC: CPT

## 2021-11-23 PROCEDURE — 85652 RBC SED RATE AUTOMATED: CPT

## 2021-11-30 ENCOUNTER — TRANSCRIBE ORDERS (OUTPATIENT)
Dept: LAB | Facility: HOSPITAL | Age: 51
End: 2021-11-30

## 2021-11-30 ENCOUNTER — LAB (OUTPATIENT)
Dept: LAB | Facility: HOSPITAL | Age: 51
End: 2021-11-30

## 2021-11-30 DIAGNOSIS — L03.113 CELLULITIS OF RIGHT HAND EXCLUDING FINGERS AND THUMB: ICD-10-CM

## 2021-11-30 DIAGNOSIS — T84.610A INFLAMMATORY REACTION DUE TO INTERNAL FIXATION DEVICE OF RIGHT HUMERUS, INITIAL ENCOUNTER (HCC): ICD-10-CM

## 2021-11-30 DIAGNOSIS — T84.610A INFLAMMATORY REACTION DUE TO INTERNAL FIXATION DEVICE OF RIGHT HUMERUS, INITIAL ENCOUNTER (HCC): Primary | ICD-10-CM

## 2021-11-30 LAB
ALBUMIN SERPL-MCNC: 4.8 G/DL (ref 3.5–5.2)
ALBUMIN/GLOB SERPL: 1.6 G/DL
ALP SERPL-CCNC: 110 U/L (ref 39–117)
ALT SERPL W P-5'-P-CCNC: 81 U/L (ref 1–33)
ANION GAP SERPL CALCULATED.3IONS-SCNC: 13 MMOL/L (ref 5–15)
AST SERPL-CCNC: 75 U/L (ref 1–32)
BASOPHILS # BLD MANUAL: 0 10*3/MM3 (ref 0–0.2)
BASOPHILS NFR BLD MANUAL: 0 % (ref 0–1.5)
BILIRUB SERPL-MCNC: 0.3 MG/DL (ref 0–1.2)
BUN SERPL-MCNC: 12 MG/DL (ref 6–20)
BUN/CREAT SERPL: 22.6 (ref 7–25)
CALCIUM SPEC-SCNC: 10.1 MG/DL (ref 8.6–10.5)
CHLORIDE SERPL-SCNC: 102 MMOL/L (ref 98–107)
CO2 SERPL-SCNC: 23 MMOL/L (ref 22–29)
CREAT SERPL-MCNC: 0.53 MG/DL (ref 0.57–1)
CRP SERPL-MCNC: <0.3 MG/DL (ref 0–0.5)
CYTOLOGIST CVX/VAG CYTO: NORMAL
DEPRECATED RDW RBC AUTO: 39.8 FL (ref 37–54)
EOSINOPHIL # BLD MANUAL: 0.03 10*3/MM3 (ref 0–0.4)
EOSINOPHIL NFR BLD MANUAL: 1 % (ref 0.3–6.2)
ERYTHROCYTE [DISTWIDTH] IN BLOOD BY AUTOMATED COUNT: 12.2 % (ref 12.3–15.4)
ERYTHROCYTE [SEDIMENTATION RATE] IN BLOOD: 11 MM/HR (ref 0–30)
GFR SERPL CREATININE-BSD FRML MDRD: 121 ML/MIN/1.73
GLOBULIN UR ELPH-MCNC: 3 GM/DL
GLUCOSE SERPL-MCNC: 111 MG/DL (ref 65–99)
HCT VFR BLD AUTO: 41.4 % (ref 34–46.6)
HGB BLD-MCNC: 14.1 G/DL (ref 12–15.9)
LYMPHOCYTES # BLD MANUAL: 2.4 10*3/MM3 (ref 0.7–3.1)
LYMPHOCYTES NFR BLD MANUAL: 10 % (ref 5–12)
MCH RBC QN AUTO: 30 PG (ref 26.6–33)
MCHC RBC AUTO-ENTMCNC: 34.1 G/DL (ref 31.5–35.7)
MCV RBC AUTO: 88.1 FL (ref 79–97)
MONOCYTES # BLD: 0.32 10*3/MM3 (ref 0.1–0.9)
NEUTROPHILS # BLD AUTO: 0.49 10*3/MM3 (ref 1.7–7)
NEUTROPHILS NFR BLD MANUAL: 12 % (ref 42.7–76)
NEUTS BAND NFR BLD MANUAL: 3 % (ref 0–5)
PATH INTERP BLD-IMP: NORMAL
PLAT MORPH BLD: NORMAL
PLATELET # BLD AUTO: 292 10*3/MM3 (ref 140–450)
PMV BLD AUTO: 10.1 FL (ref 6–12)
POTASSIUM SERPL-SCNC: 4.2 MMOL/L (ref 3.5–5.2)
PROT SERPL-MCNC: 7.8 G/DL (ref 6–8.5)
RBC # BLD AUTO: 4.7 10*6/MM3 (ref 3.77–5.28)
RBC MORPH BLD: NORMAL
SODIUM SERPL-SCNC: 138 MMOL/L (ref 136–145)
VARIANT LYMPHS NFR BLD MANUAL: 36 % (ref 0–5)
VARIANT LYMPHS NFR BLD MANUAL: 38 % (ref 19.6–45.3)
WBC MORPH BLD: NORMAL
WBC NRBC COR # BLD: 3.24 10*3/MM3 (ref 3.4–10.8)

## 2021-11-30 PROCEDURE — 85060 BLOOD SMEAR INTERPRETATION: CPT

## 2021-11-30 PROCEDURE — 36415 COLL VENOUS BLD VENIPUNCTURE: CPT

## 2021-11-30 PROCEDURE — 85007 BL SMEAR W/DIFF WBC COUNT: CPT

## 2021-11-30 PROCEDURE — 80053 COMPREHEN METABOLIC PANEL: CPT

## 2021-11-30 PROCEDURE — 85652 RBC SED RATE AUTOMATED: CPT

## 2021-11-30 PROCEDURE — 85025 COMPLETE CBC W/AUTO DIFF WBC: CPT

## 2021-11-30 PROCEDURE — 86140 C-REACTIVE PROTEIN: CPT

## 2021-12-03 ENCOUNTER — TRANSCRIBE ORDERS (OUTPATIENT)
Dept: LAB | Facility: HOSPITAL | Age: 51
End: 2021-12-03

## 2021-12-03 ENCOUNTER — LAB (OUTPATIENT)
Dept: LAB | Facility: HOSPITAL | Age: 51
End: 2021-12-03

## 2021-12-03 DIAGNOSIS — L03.113 CELLULITIS OF RIGHT HAND EXCLUDING FINGERS AND THUMB: ICD-10-CM

## 2021-12-03 DIAGNOSIS — L03.113 CELLULITIS OF RIGHT HAND EXCLUDING FINGERS AND THUMB: Primary | ICD-10-CM

## 2021-12-03 LAB
BASOPHILS # BLD MANUAL: 0.06 10*3/MM3 (ref 0–0.2)
BASOPHILS NFR BLD MANUAL: 1 % (ref 0–1.5)
DEPRECATED RDW RBC AUTO: 40.5 FL (ref 37–54)
EOSINOPHIL # BLD MANUAL: 0.32 10*3/MM3 (ref 0–0.4)
EOSINOPHIL NFR BLD MANUAL: 5 % (ref 0.3–6.2)
ERYTHROCYTE [DISTWIDTH] IN BLOOD BY AUTOMATED COUNT: 12.4 % (ref 12.3–15.4)
HCT VFR BLD AUTO: 42.7 % (ref 34–46.6)
HGB BLD-MCNC: 14.5 G/DL (ref 12–15.9)
LYMPHOCYTES # BLD MANUAL: 3.29 10*3/MM3 (ref 0.7–3.1)
LYMPHOCYTES NFR BLD MANUAL: 9 % (ref 5–12)
MCH RBC QN AUTO: 30.3 PG (ref 26.6–33)
MCHC RBC AUTO-ENTMCNC: 34 G/DL (ref 31.5–35.7)
MCV RBC AUTO: 89.1 FL (ref 79–97)
MONOCYTES # BLD: 0.58 10*3/MM3 (ref 0.1–0.9)
NEUTROPHILS # BLD AUTO: 2.2 10*3/MM3 (ref 1.7–7)
NEUTROPHILS NFR BLD MANUAL: 31 % (ref 42.7–76)
NEUTS BAND NFR BLD MANUAL: 3 % (ref 0–5)
PLAT MORPH BLD: NORMAL
PLATELET # BLD AUTO: 388 10*3/MM3 (ref 140–450)
PMV BLD AUTO: 10.7 FL (ref 6–12)
RBC # BLD AUTO: 4.79 10*6/MM3 (ref 3.77–5.28)
RBC MORPH BLD: NORMAL
VARIANT LYMPHS NFR BLD MANUAL: 15 % (ref 0–5)
VARIANT LYMPHS NFR BLD MANUAL: 36 % (ref 19.6–45.3)
WBC MORPH BLD: NORMAL
WBC NRBC COR # BLD: 6.46 10*3/MM3 (ref 3.4–10.8)

## 2021-12-03 PROCEDURE — 85027 COMPLETE CBC AUTOMATED: CPT

## 2021-12-03 PROCEDURE — 85007 BL SMEAR W/DIFF WBC COUNT: CPT

## 2021-12-03 PROCEDURE — 36415 COLL VENOUS BLD VENIPUNCTURE: CPT

## 2022-05-17 ENCOUNTER — TRANSCRIBE ORDERS (OUTPATIENT)
Dept: ADMINISTRATIVE | Facility: HOSPITAL | Age: 52
End: 2022-05-17

## 2022-05-17 DIAGNOSIS — Z12.31 VISIT FOR SCREENING MAMMOGRAM: Primary | ICD-10-CM

## 2022-07-25 ENCOUNTER — HOSPITAL ENCOUNTER (OUTPATIENT)
Dept: MAMMOGRAPHY | Facility: HOSPITAL | Age: 52
Discharge: HOME | End: 2022-07-25
Admitting: OBSTETRICS & GYNECOLOGY

## 2022-07-25 DIAGNOSIS — Z12.31 VISIT FOR SCREENING MAMMOGRAM: ICD-10-CM

## 2022-07-25 PROCEDURE — 77067 SCR MAMMO BI INCL CAD: CPT | Performed by: RADIOLOGY

## 2022-07-25 PROCEDURE — 77067 SCR MAMMO BI INCL CAD: CPT

## 2022-07-25 PROCEDURE — 77063 BREAST TOMOSYNTHESIS BI: CPT | Performed by: RADIOLOGY

## 2022-07-25 PROCEDURE — 77063 BREAST TOMOSYNTHESIS BI: CPT

## 2022-10-04 ENCOUNTER — HOSPITAL ENCOUNTER (OUTPATIENT)
Dept: MAMMOGRAPHY | Facility: HOSPITAL | Age: 52
Discharge: HOME | End: 2022-10-04

## 2022-10-04 ENCOUNTER — HOSPITAL ENCOUNTER (OUTPATIENT)
Dept: ULTRASOUND IMAGING | Facility: HOSPITAL | Age: 52
Discharge: HOME | End: 2022-10-04

## 2022-10-04 DIAGNOSIS — R92.8 ABNORMAL MAMMOGRAM: ICD-10-CM

## 2022-10-04 PROCEDURE — G0279 TOMOSYNTHESIS, MAMMO: HCPCS

## 2022-10-04 PROCEDURE — 76642 ULTRASOUND BREAST LIMITED: CPT | Performed by: RADIOLOGY

## 2022-10-04 PROCEDURE — 77066 DX MAMMO INCL CAD BI: CPT

## 2022-10-04 PROCEDURE — 77062 BREAST TOMOSYNTHESIS BI: CPT | Performed by: RADIOLOGY

## 2022-10-04 PROCEDURE — 76642 ULTRASOUND BREAST LIMITED: CPT

## 2022-10-04 PROCEDURE — 77066 DX MAMMO INCL CAD BI: CPT | Performed by: RADIOLOGY

## 2023-06-15 ENCOUNTER — TRANSCRIBE ORDERS (OUTPATIENT)
Dept: ADMINISTRATIVE | Facility: HOSPITAL | Age: 53
End: 2023-06-15
Payer: COMMERCIAL

## 2023-06-15 ENCOUNTER — HOSPITAL ENCOUNTER (OUTPATIENT)
Dept: CT IMAGING | Facility: HOSPITAL | Age: 53
Discharge: HOME | End: 2023-06-15
Admitting: NURSE PRACTITIONER
Payer: COMMERCIAL

## 2023-06-15 DIAGNOSIS — R10.13 EPIGASTRIC PAIN: ICD-10-CM

## 2023-06-15 DIAGNOSIS — R10.13 EPIGASTRIC PAIN: Primary | ICD-10-CM

## 2023-06-15 PROCEDURE — 74176 CT ABD & PELVIS W/O CONTRAST: CPT

## 2023-08-02 ENCOUNTER — TRANSCRIBE ORDERS (OUTPATIENT)
Dept: ADMINISTRATIVE | Facility: HOSPITAL | Age: 53
End: 2023-08-02
Payer: COMMERCIAL

## 2023-08-02 DIAGNOSIS — Z12.31 BREAST CANCER SCREENING BY MAMMOGRAM: Primary | ICD-10-CM

## 2023-08-08 ENCOUNTER — HOSPITAL ENCOUNTER (OUTPATIENT)
Dept: MAMMOGRAPHY | Facility: HOSPITAL | Age: 53
Discharge: HOME | End: 2023-08-08
Admitting: OBSTETRICS & GYNECOLOGY
Payer: COMMERCIAL

## 2023-08-08 DIAGNOSIS — Z12.31 BREAST CANCER SCREENING BY MAMMOGRAM: ICD-10-CM

## 2023-08-08 PROCEDURE — 77063 BREAST TOMOSYNTHESIS BI: CPT

## 2023-08-08 PROCEDURE — 77067 SCR MAMMO BI INCL CAD: CPT

## 2024-07-05 ENCOUNTER — TRANSCRIBE ORDERS (OUTPATIENT)
Dept: ADMINISTRATIVE | Facility: HOSPITAL | Age: 54
End: 2024-07-05
Payer: COMMERCIAL

## 2024-07-05 DIAGNOSIS — Z12.31 VISIT FOR SCREENING MAMMOGRAM: Primary | ICD-10-CM

## 2024-07-25 LAB
NCCN CRITERIA FLAG: NORMAL
TYRER CUZICK SCORE: 7.8

## 2024-08-09 ENCOUNTER — HOSPITAL ENCOUNTER (OUTPATIENT)
Dept: MAMMOGRAPHY | Facility: HOSPITAL | Age: 54
Discharge: HOME | End: 2024-08-09
Admitting: OBSTETRICS & GYNECOLOGY
Payer: COMMERCIAL

## 2024-08-09 DIAGNOSIS — Z12.31 VISIT FOR SCREENING MAMMOGRAM: ICD-10-CM

## 2024-08-09 PROCEDURE — 77063 BREAST TOMOSYNTHESIS BI: CPT

## 2024-08-09 PROCEDURE — 77067 SCR MAMMO BI INCL CAD: CPT

## 2025-07-07 ENCOUNTER — TRANSCRIBE ORDERS (OUTPATIENT)
Dept: ADMINISTRATIVE | Facility: HOSPITAL | Age: 55
End: 2025-07-07
Payer: COMMERCIAL

## 2025-07-07 DIAGNOSIS — Z12.31 SCREENING MAMMOGRAM FOR BREAST CANCER: Primary | ICD-10-CM

## 2025-07-27 LAB
NCCN CRITERIA FLAG: NORMAL
TYRER CUZICK SCORE: 7.7

## 2025-08-11 ENCOUNTER — HOSPITAL ENCOUNTER (OUTPATIENT)
Dept: MAMMOGRAPHY | Facility: HOSPITAL | Age: 55
Discharge: HOME | End: 2025-08-11
Admitting: OBSTETRICS & GYNECOLOGY
Payer: COMMERCIAL

## 2025-08-11 DIAGNOSIS — Z12.31 SCREENING MAMMOGRAM FOR BREAST CANCER: ICD-10-CM

## 2025-08-11 PROCEDURE — 77067 SCR MAMMO BI INCL CAD: CPT

## 2025-08-11 PROCEDURE — 77063 BREAST TOMOSYNTHESIS BI: CPT

## (undated) DEVICE — SUT PDS MONO 0 36 CT1 VIL PDP346H

## (undated) DEVICE — SYR CONTRL LUERLOK 10CC

## (undated) DEVICE — GLV SURG TRIUMPH ORTHO W/ALOE PF LTX 8 STRL

## (undated) DEVICE — DRSNG GZ PETROLTM XEROFORM CURAD 1X8IN STRL

## (undated) DEVICE — SYR LUERLOK 50ML

## (undated) DEVICE — CULT ANAERB

## (undated) DEVICE — NDL HYPO ECLPS SFTY 22G 1 1/2IN

## (undated) DEVICE — ARM SLING: Brand: DEROYAL

## (undated) DEVICE — UNDERCAST PADDING: Brand: DEROYAL

## (undated) DEVICE — GOWN,REINF,POLY,ECL,PP SLV,XL: Brand: MEDLINE

## (undated) DEVICE — PK EXTREM UPPR 10

## (undated) DEVICE — HANDPIECE SET WITH HIGH FLOW TIP AND SUCTION TUBE: Brand: INTERPULSE

## (undated) DEVICE — INTENT TO BE USED WITH SUTURE MATERIAL FOR TISSUE CLOSURE: Brand: RICHARD-ALLAN® NEEDLE 1/2 CIRCLE TAPER

## (undated) DEVICE — BANDAGE,GAUZE,BULKEE II,4.5"X4.1YD,STRL: Brand: MEDLINE

## (undated) DEVICE — DISPOSABLE TOURNIQUET CUFF SINGLE BLADDER, DUAL PORT AND QUICK CONNECT CONNECTOR: Brand: COLOR CUFF

## (undated) DEVICE — SYS SKIN CLS DERMABOND PRINEO W/22CM MESH TP

## (undated) DEVICE — SUT ETHLN 3/0 PC5 18IN 1893G

## (undated) DEVICE — SUT PDS 2/0 CT2 27IN VIL PDP333H

## (undated) DEVICE — CULT AERO SGL CA/50

## (undated) DEVICE — GLV SURG SENSICARE MICRO PF LF 8 STRL

## (undated) DEVICE — BNDG ELAS ELITE V/CLOSE 4IN 5YD LF STRL

## (undated) DEVICE — SUT PDS 2 0 CT1 27IN CLR Z259H

## (undated) DEVICE — REAMER: Brand: LMH